# Patient Record
Sex: MALE | Race: WHITE | Employment: FULL TIME | URBAN - METROPOLITAN AREA
[De-identification: names, ages, dates, MRNs, and addresses within clinical notes are randomized per-mention and may not be internally consistent; named-entity substitution may affect disease eponyms.]

---

## 2019-03-21 ENCOUNTER — HOSPITAL ENCOUNTER (OUTPATIENT)
Age: 64
Discharge: HOME | End: 2019-03-23
Payer: COMMERCIAL

## 2019-03-21 ENCOUNTER — OFFICE VISIT (OUTPATIENT)
Dept: PRIMARY CARE CLINIC | Age: 64
End: 2019-03-21
Payer: COMMERCIAL

## 2019-03-21 VITALS
SYSTOLIC BLOOD PRESSURE: 140 MMHG | HEIGHT: 68 IN | OXYGEN SATURATION: 98 % | WEIGHT: 186 LBS | HEART RATE: 76 BPM | TEMPERATURE: 98 F | DIASTOLIC BLOOD PRESSURE: 92 MMHG | BODY MASS INDEX: 28.19 KG/M2

## 2019-03-21 DIAGNOSIS — Z12.5 SCREENING PSA (PROSTATE SPECIFIC ANTIGEN): ICD-10-CM

## 2019-03-21 DIAGNOSIS — N52.9 IMPOTENCE: ICD-10-CM

## 2019-03-21 DIAGNOSIS — Z00.00 ANNUAL PHYSICAL EXAM: Primary | ICD-10-CM

## 2019-03-21 DIAGNOSIS — Z00.00 ANNUAL PHYSICAL EXAM: ICD-10-CM

## 2019-03-21 LAB
ALBUMIN SERPL-MCNC: 4.4 G/DL (ref 3.5–5.2)
ALP BLD-CCNC: 83 U/L (ref 40–129)
ALT SERPL-CCNC: 30 U/L (ref 0–40)
ANION GAP SERPL CALCULATED.3IONS-SCNC: 16 MMOL/L (ref 7–16)
AST SERPL-CCNC: 31 U/L (ref 0–39)
BILIRUB SERPL-MCNC: 0.7 MG/DL (ref 0–1.2)
BUN BLDV-MCNC: 13 MG/DL (ref 8–23)
CALCIUM SERPL-MCNC: 9.4 MG/DL (ref 8.6–10.2)
CHLORIDE BLD-SCNC: 103 MMOL/L (ref 98–107)
CHOLESTEROL, TOTAL: 168 MG/DL (ref 0–199)
CO2: 24 MMOL/L (ref 22–29)
CREAT SERPL-MCNC: 0.9 MG/DL (ref 0.7–1.2)
GFR AFRICAN AMERICAN: >60
GFR NON-AFRICAN AMERICAN: >60 ML/MIN/1.73
GLUCOSE BLD-MCNC: 100 MG/DL (ref 74–99)
HCT VFR BLD CALC: 46.5 % (ref 37–54)
HDLC SERPL-MCNC: 32 MG/DL
HEMOGLOBIN: 16.1 G/DL (ref 12.5–16.5)
LDL CHOLESTEROL CALCULATED: 87 MG/DL (ref 0–99)
MCH RBC QN AUTO: 32.9 PG (ref 26–35)
MCHC RBC AUTO-ENTMCNC: 34.6 % (ref 32–34.5)
MCV RBC AUTO: 95.1 FL (ref 80–99.9)
PDW BLD-RTO: 12.2 FL (ref 11.5–15)
PLATELET # BLD: 162 E9/L (ref 130–450)
PMV BLD AUTO: 12.2 FL (ref 7–12)
POTASSIUM SERPL-SCNC: 4.1 MMOL/L (ref 3.5–5)
PROSTATE SPECIFIC ANTIGEN: 2.67 NG/ML (ref 0–4)
RBC # BLD: 4.89 E12/L (ref 3.8–5.8)
SODIUM BLD-SCNC: 143 MMOL/L (ref 132–146)
TOTAL PROTEIN: 8.1 G/DL (ref 6.4–8.3)
TRIGL SERPL-MCNC: 245 MG/DL (ref 0–149)
VLDLC SERPL CALC-MCNC: 49 MG/DL
WBC # BLD: 5.8 E9/L (ref 4.5–11.5)

## 2019-03-21 PROCEDURE — 80053 COMPREHEN METABOLIC PANEL: CPT

## 2019-03-21 PROCEDURE — 99396 PREV VISIT EST AGE 40-64: CPT | Performed by: FAMILY MEDICINE

## 2019-03-21 PROCEDURE — 81002 URINALYSIS NONAUTO W/O SCOPE: CPT | Performed by: FAMILY MEDICINE

## 2019-03-21 PROCEDURE — G8484 FLU IMMUNIZE NO ADMIN: HCPCS | Performed by: FAMILY MEDICINE

## 2019-03-21 PROCEDURE — 85027 COMPLETE CBC AUTOMATED: CPT

## 2019-03-21 PROCEDURE — 80061 LIPID PANEL: CPT

## 2019-03-21 PROCEDURE — G0103 PSA SCREENING: HCPCS

## 2019-03-21 RX ORDER — SILDENAFIL 100 MG/1
100 TABLET, FILM COATED ORAL PRN
Qty: 10 TABLET | Refills: 5 | Status: ON HOLD | OUTPATIENT
Start: 2019-03-21 | End: 2019-08-19 | Stop reason: HOSPADM

## 2019-03-21 RX ORDER — CLOTRIMAZOLE AND BETAMETHASONE DIPROPIONATE 10; .64 MG/G; MG/G
CREAM TOPICAL
Qty: 45 G | Refills: 1 | Status: ON HOLD | OUTPATIENT
Start: 2019-03-21 | End: 2019-08-19 | Stop reason: HOSPADM

## 2019-03-21 RX ORDER — CLOPIDOGREL BISULFATE 75 MG/1
75 TABLET ORAL DAILY
Qty: 90 TABLET | Refills: 1 | Status: SHIPPED | OUTPATIENT
Start: 2019-03-21 | End: 2019-08-27 | Stop reason: SDUPTHER

## 2019-03-21 RX ORDER — OMEGA-3-ACID ETHYL ESTERS 1 G/1
2 CAPSULE, LIQUID FILLED ORAL 2 TIMES DAILY
Qty: 360 CAPSULE | Refills: 1 | Status: SHIPPED | OUTPATIENT
Start: 2019-03-21

## 2019-03-21 RX ORDER — METOPROLOL SUCCINATE 50 MG/1
50 TABLET, EXTENDED RELEASE ORAL DAILY
Qty: 90 TABLET | Refills: 1 | Status: SHIPPED | OUTPATIENT
Start: 2019-03-21 | End: 2019-08-27 | Stop reason: SDUPTHER

## 2019-03-21 RX ORDER — LISINOPRIL AND HYDROCHLOROTHIAZIDE 20; 12.5 MG/1; MG/1
1 TABLET ORAL DAILY
Qty: 90 TABLET | Refills: 1 | Status: ON HOLD | OUTPATIENT
Start: 2019-03-21 | End: 2019-08-19 | Stop reason: HOSPADM

## 2019-03-21 RX ORDER — LISINOPRIL 20 MG/1
20 TABLET ORAL DAILY
Qty: 90 TABLET | Refills: 1 | Status: CANCELLED | OUTPATIENT
Start: 2019-03-21

## 2019-03-21 ASSESSMENT — PATIENT HEALTH QUESTIONNAIRE - PHQ9
SUM OF ALL RESPONSES TO PHQ QUESTIONS 1-9: 0
SUM OF ALL RESPONSES TO PHQ9 QUESTIONS 1 & 2: 0
2. FEELING DOWN, DEPRESSED OR HOPELESS: 0
1. LITTLE INTEREST OR PLEASURE IN DOING THINGS: 0
SUM OF ALL RESPONSES TO PHQ QUESTIONS 1-9: 0

## 2019-03-25 ENCOUNTER — TELEPHONE (OUTPATIENT)
Dept: PRIMARY CARE CLINIC | Age: 64
End: 2019-03-25

## 2019-07-31 ENCOUNTER — APPOINTMENT (OUTPATIENT)
Dept: GENERAL RADIOLOGY | Age: 64
End: 2019-07-31
Payer: COMMERCIAL

## 2019-07-31 ENCOUNTER — APPOINTMENT (OUTPATIENT)
Dept: CT IMAGING | Age: 64
End: 2019-07-31
Payer: COMMERCIAL

## 2019-07-31 ENCOUNTER — APPOINTMENT (OUTPATIENT)
Dept: ULTRASOUND IMAGING | Age: 64
DRG: 065 | End: 2019-07-31
Attending: INTERNAL MEDICINE
Payer: COMMERCIAL

## 2019-07-31 ENCOUNTER — HOSPITAL ENCOUNTER (EMERGENCY)
Age: 64
Discharge: ANOTHER HOSPITAL | End: 2019-07-31
Attending: EMERGENCY MEDICINE
Payer: COMMERCIAL

## 2019-07-31 ENCOUNTER — HOSPITAL ENCOUNTER (OUTPATIENT)
Age: 64
Discharge: HOME | End: 2019-07-31
Payer: COMMERCIAL

## 2019-07-31 ENCOUNTER — HOSPITAL ENCOUNTER (INPATIENT)
Age: 64
LOS: 5 days | Discharge: NURSING, REHAB OR LONG-TERM CARE | DRG: 065 | End: 2019-08-05
Attending: INTERNAL MEDICINE | Admitting: INTERNAL MEDICINE
Payer: COMMERCIAL

## 2019-07-31 VITALS
RESPIRATION RATE: 17 BRPM | OXYGEN SATURATION: 96 % | DIASTOLIC BLOOD PRESSURE: 94 MMHG | SYSTOLIC BLOOD PRESSURE: 194 MMHG | HEIGHT: 68 IN | BODY MASS INDEX: 27.28 KG/M2 | TEMPERATURE: 97.8 F | HEART RATE: 53 BPM | WEIGHT: 180 LBS

## 2019-07-31 DIAGNOSIS — I63.9 CEREBROVASCULAR ACCIDENT (CVA), UNSPECIFIED MECHANISM (HCC): Primary | ICD-10-CM

## 2019-07-31 PROBLEM — I25.10 CAD (CORONARY ARTERY DISEASE): Chronic | Status: ACTIVE | Noted: 2019-07-31

## 2019-07-31 PROBLEM — Z95.5 S/P CORONARY ARTERY STENT PLACEMENT: Chronic | Status: ACTIVE | Noted: 2019-07-31

## 2019-07-31 LAB
ANION GAP SERPL CALCULATED.3IONS-SCNC: 12 MMOL/L (ref 7–16)
APTT: 28.1 SEC (ref 24.5–35.1)
BUN BLDV-MCNC: 10 MG/DL (ref 8–23)
CALCIUM SERPL-MCNC: 9.1 MG/DL (ref 8.6–10.2)
CHLORIDE BLD-SCNC: 102 MMOL/L (ref 98–107)
CO2: 24 MMOL/L (ref 22–29)
CREAT SERPL-MCNC: 0.8 MG/DL (ref 0.7–1.2)
EKG ATRIAL RATE: 63 BPM
EKG P AXIS: 11 DEGREES
EKG P-R INTERVAL: 176 MS
EKG Q-T INTERVAL: 410 MS
EKG QRS DURATION: 96 MS
EKG QTC CALCULATION (BAZETT): 419 MS
EKG R AXIS: -2 DEGREES
EKG T AXIS: -9 DEGREES
EKG VENTRICULAR RATE: 63 BPM
GFR AFRICAN AMERICAN: >60
GFR NON-AFRICAN AMERICAN: >60 ML/MIN/1.73
GLUCOSE BLD-MCNC: 117 MG/DL (ref 74–99)
HCT VFR BLD CALC: 45.9 % (ref 37–54)
HEMOGLOBIN: 16 G/DL (ref 12.5–16.5)
INR BLD: 1.1
LV EF: 60 %
LVEF MODALITY: NORMAL
MCH RBC QN AUTO: 32.5 PG (ref 26–35)
MCHC RBC AUTO-ENTMCNC: 34.9 % (ref 32–34.5)
MCV RBC AUTO: 93.1 FL (ref 80–99.9)
PDW BLD-RTO: 11.6 FL (ref 11.5–15)
PLATELET # BLD: 159 E9/L (ref 130–450)
PMV BLD AUTO: 11.4 FL (ref 7–12)
POTASSIUM SERPL-SCNC: 5.4 MMOL/L (ref 3.5–5)
PROTHROMBIN TIME: 12.5 SEC (ref 9.3–12.4)
RBC # BLD: 4.93 E12/L (ref 3.8–5.8)
SODIUM BLD-SCNC: 138 MMOL/L (ref 132–146)
TROPONIN: <0.01 NG/ML (ref 0–0.03)
WBC # BLD: 6.4 E9/L (ref 4.5–11.5)

## 2019-07-31 PROCEDURE — 97162 PT EVAL MOD COMPLEX 30 MIN: CPT

## 2019-07-31 PROCEDURE — 99285 EMERGENCY DEPT VISIT HI MDM: CPT

## 2019-07-31 PROCEDURE — 6360000002 HC RX W HCPCS: Performed by: INTERNAL MEDICINE

## 2019-07-31 PROCEDURE — 85730 THROMBOPLASTIN TIME PARTIAL: CPT

## 2019-07-31 PROCEDURE — 85610 PROTHROMBIN TIME: CPT

## 2019-07-31 PROCEDURE — 97530 THERAPEUTIC ACTIVITIES: CPT

## 2019-07-31 PROCEDURE — 93880 EXTRACRANIAL BILAT STUDY: CPT

## 2019-07-31 PROCEDURE — 2580000003 HC RX 258: Performed by: INTERNAL MEDICINE

## 2019-07-31 PROCEDURE — 93005 ELECTROCARDIOGRAM TRACING: CPT | Performed by: EMERGENCY MEDICINE

## 2019-07-31 PROCEDURE — 93010 ELECTROCARDIOGRAM REPORT: CPT | Performed by: INTERNAL MEDICINE

## 2019-07-31 PROCEDURE — 36415 COLL VENOUS BLD VENIPUNCTURE: CPT

## 2019-07-31 PROCEDURE — 80048 BASIC METABOLIC PNL TOTAL CA: CPT

## 2019-07-31 PROCEDURE — 70450 CT HEAD/BRAIN W/O DYE: CPT

## 2019-07-31 PROCEDURE — 71045 X-RAY EXAM CHEST 1 VIEW: CPT

## 2019-07-31 PROCEDURE — 93306 TTE W/DOPPLER COMPLETE: CPT

## 2019-07-31 PROCEDURE — 85027 COMPLETE CBC AUTOMATED: CPT

## 2019-07-31 PROCEDURE — 6370000000 HC RX 637 (ALT 250 FOR IP): Performed by: INTERNAL MEDICINE

## 2019-07-31 PROCEDURE — 2060000000 HC ICU INTERMEDIATE R&B

## 2019-07-31 PROCEDURE — 84484 ASSAY OF TROPONIN QUANT: CPT

## 2019-07-31 PROCEDURE — A0426 ALS 1: HCPCS

## 2019-07-31 PROCEDURE — A0425 GROUND MILEAGE: HCPCS

## 2019-07-31 RX ORDER — SODIUM CHLORIDE 0.9 % (FLUSH) 0.9 %
10 SYRINGE (ML) INJECTION PRN
Status: DISCONTINUED | OUTPATIENT
Start: 2019-07-31 | End: 2019-08-05 | Stop reason: HOSPADM

## 2019-07-31 RX ORDER — ONDANSETRON 2 MG/ML
4 INJECTION INTRAMUSCULAR; INTRAVENOUS EVERY 6 HOURS PRN
Status: DISCONTINUED | OUTPATIENT
Start: 2019-07-31 | End: 2019-08-05 | Stop reason: HOSPADM

## 2019-07-31 RX ORDER — SODIUM CHLORIDE 0.9 % (FLUSH) 0.9 %
10 SYRINGE (ML) INJECTION EVERY 12 HOURS SCHEDULED
Status: DISCONTINUED | OUTPATIENT
Start: 2019-07-31 | End: 2019-08-05 | Stop reason: HOSPADM

## 2019-07-31 RX ORDER — ACETAMINOPHEN 325 MG/1
650 TABLET ORAL EVERY 4 HOURS PRN
Status: DISCONTINUED | OUTPATIENT
Start: 2019-07-31 | End: 2019-08-05 | Stop reason: HOSPADM

## 2019-07-31 RX ORDER — ATORVASTATIN CALCIUM 40 MG/1
40 TABLET, FILM COATED ORAL NIGHTLY
Status: DISCONTINUED | OUTPATIENT
Start: 2019-07-31 | End: 2019-08-05 | Stop reason: HOSPADM

## 2019-07-31 RX ORDER — LISINOPRIL 20 MG/1
20 TABLET ORAL DAILY
Status: DISCONTINUED | OUTPATIENT
Start: 2019-07-31 | End: 2019-08-02

## 2019-07-31 RX ORDER — ASPIRIN 81 MG/1
81 TABLET ORAL DAILY
Status: DISCONTINUED | OUTPATIENT
Start: 2019-07-31 | End: 2019-08-05 | Stop reason: HOSPADM

## 2019-07-31 RX ORDER — CLOPIDOGREL BISULFATE 75 MG/1
75 TABLET ORAL DAILY
Status: DISCONTINUED | OUTPATIENT
Start: 2019-07-31 | End: 2019-08-05 | Stop reason: HOSPADM

## 2019-07-31 RX ORDER — LISINOPRIL 20 MG/1
20 TABLET ORAL ONCE
Status: COMPLETED | OUTPATIENT
Start: 2019-07-31 | End: 2019-07-31

## 2019-07-31 RX ORDER — METOPROLOL SUCCINATE 50 MG/1
50 TABLET, EXTENDED RELEASE ORAL DAILY
Status: DISCONTINUED | OUTPATIENT
Start: 2019-07-31 | End: 2019-08-05 | Stop reason: HOSPADM

## 2019-07-31 RX ADMIN — ATORVASTATIN CALCIUM 40 MG: 40 TABLET, FILM COATED ORAL at 20:05

## 2019-07-31 RX ADMIN — LISINOPRIL 20 MG: 20 TABLET ORAL at 20:03

## 2019-07-31 RX ADMIN — ENOXAPARIN SODIUM 40 MG: 40 INJECTION SUBCUTANEOUS at 20:03

## 2019-07-31 RX ADMIN — Medication 10 ML: at 20:05

## 2019-07-31 RX ADMIN — CLOPIDOGREL 75 MG: 75 TABLET, FILM COATED ORAL at 20:04

## 2019-07-31 ASSESSMENT — PAIN SCALES - GENERAL
PAINLEVEL_OUTOF10: 0
PAINLEVEL_OUTOF10: 3

## 2019-07-31 ASSESSMENT — PAIN DESCRIPTION - PAIN TYPE: TYPE: ACUTE PAIN

## 2019-07-31 ASSESSMENT — PAIN DESCRIPTION - LOCATION: LOCATION: SHOULDER

## 2019-07-31 ASSESSMENT — ENCOUNTER SYMPTOMS
NAUSEA: 0
TROUBLE SWALLOWING: 0
SHORTNESS OF BREATH: 0
VISUAL CHANGE: 0
BACK PAIN: 0
VOMITING: 0

## 2019-07-31 ASSESSMENT — PAIN DESCRIPTION - DESCRIPTORS: DESCRIPTORS: CONSTANT;DULL

## 2019-07-31 ASSESSMENT — PAIN DESCRIPTION - ORIENTATION: ORIENTATION: LEFT

## 2019-07-31 ASSESSMENT — PAIN DESCRIPTION - FREQUENCY: FREQUENCY: CONTINUOUS

## 2019-07-31 NOTE — PROGRESS NOTES
Physical Therapy  Initial Assessment     Name: Young Urrutia  : 1955  MRN: [id number]    Date of Service: 2019    Evaluating PT: Tee Patel PT, DPT [id number]    Room #:  4089/7490-N    Diagnosis: Acute CVA  Precautions: Fall risk    Pt lives alone in a 2 story house with 3 stair(s) and 1 rail(s) to enter. Bed is on the second floor and bath is on the second floor. Full flight of stairs and 1 rail to second floor. Pt ambulated without AD Independent prior to admission. Pt works full time as the president of a company. HPI: Pt presented to the ED on  for R sided weakness, incoordination, and speech impairment. Initial Evaluation  Date: 19 Treatment Date: Short Term/ Long Term   Goals   AM-PAC 6 Clicks 46/92     Was pt agreeable to Eval/treatment? Yes     Does pt have pain? No current complaints of pain     Bed Mobility  Rolling: SBA  Supine to sit: SBA  Sit to supine: NT  Scooting: SBA toward EOB  Rolling: Independent   Supine to sit: Independent   Sit to supine: Independent   Scooting: Independent    Transfers Sit to stand: SBA  Stand to sit: SBA  Stand pivot: Min A without AD  Sit to stand: Independent   Stand to sit: Independent   Stand pivot: Independent    Ambulation   75 feet x2 without AD with Min A  400 feet Independent    Stair negotiation: 4 steps with 2 rail(s) with Min A  10 step(s) with 1 rail(s) Mod Independent    ROM B UE: Refer to OT note  B LE: WFL     Strength B UE: Refer to OT note  B LE: L 5/5, R 4+/5     Balance Sitting EOB: SBA  Dynamic standing: Min A without AD  Sitting EOB: Independent   Dynamic standing: Independent      Pt is A & O x: 4 to person, place, month/year, and situation. Sensation: Pt denies numbness and tingling to extremities. B LE light touch sensation intact. Coordination: NT  Edema: Unremarkable. ASSESSMENT    Patient education  Pt educated on proper technique/safety when negotiating stairs.     Patient response to education:   Pt

## 2019-08-01 ENCOUNTER — APPOINTMENT (OUTPATIENT)
Dept: MRI IMAGING | Age: 64
DRG: 065 | End: 2019-08-01
Attending: INTERNAL MEDICINE
Payer: COMMERCIAL

## 2019-08-01 LAB
ANION GAP SERPL CALCULATED.3IONS-SCNC: 14 MMOL/L (ref 7–16)
BUN BLDV-MCNC: 11 MG/DL (ref 8–23)
CALCIUM SERPL-MCNC: 9.8 MG/DL (ref 8.6–10.2)
CHLORIDE BLD-SCNC: 101 MMOL/L (ref 98–107)
CHOLESTEROL, TOTAL: 182 MG/DL (ref 0–199)
CO2: 26 MMOL/L (ref 22–29)
CREAT SERPL-MCNC: 0.9 MG/DL (ref 0.7–1.2)
GFR AFRICAN AMERICAN: >60
GFR NON-AFRICAN AMERICAN: >60 ML/MIN/1.73
GLUCOSE BLD-MCNC: 116 MG/DL (ref 74–99)
HDLC SERPL-MCNC: 30 MG/DL
LDL CHOLESTEROL CALCULATED: 102 MG/DL (ref 0–99)
POTASSIUM SERPL-SCNC: 3.8 MMOL/L (ref 3.5–5)
SODIUM BLD-SCNC: 141 MMOL/L (ref 132–146)
TRIGL SERPL-MCNC: 248 MG/DL (ref 0–149)
VLDLC SERPL CALC-MCNC: 50 MG/DL

## 2019-08-01 PROCEDURE — 70551 MRI BRAIN STEM W/O DYE: CPT

## 2019-08-01 PROCEDURE — 97110 THERAPEUTIC EXERCISES: CPT

## 2019-08-01 PROCEDURE — 97530 THERAPEUTIC ACTIVITIES: CPT

## 2019-08-01 PROCEDURE — 2580000003 HC RX 258: Performed by: INTERNAL MEDICINE

## 2019-08-01 PROCEDURE — 80061 LIPID PANEL: CPT

## 2019-08-01 PROCEDURE — 97535 SELF CARE MNGMENT TRAINING: CPT

## 2019-08-01 PROCEDURE — 6370000000 HC RX 637 (ALT 250 FOR IP): Performed by: INTERNAL MEDICINE

## 2019-08-01 PROCEDURE — 97166 OT EVAL MOD COMPLEX 45 MIN: CPT

## 2019-08-01 PROCEDURE — 36415 COLL VENOUS BLD VENIPUNCTURE: CPT

## 2019-08-01 PROCEDURE — 92526 ORAL FUNCTION THERAPY: CPT | Performed by: SPEECH-LANGUAGE PATHOLOGIST

## 2019-08-01 PROCEDURE — 80048 BASIC METABOLIC PNL TOTAL CA: CPT

## 2019-08-01 PROCEDURE — 6360000002 HC RX W HCPCS: Performed by: INTERNAL MEDICINE

## 2019-08-01 PROCEDURE — 92610 EVALUATE SWALLOWING FUNCTION: CPT | Performed by: SPEECH-LANGUAGE PATHOLOGIST

## 2019-08-01 PROCEDURE — 92523 SPEECH SOUND LANG COMPREHEN: CPT | Performed by: SPEECH-LANGUAGE PATHOLOGIST

## 2019-08-01 PROCEDURE — 2060000000 HC ICU INTERMEDIATE R&B

## 2019-08-01 RX ADMIN — ACETAMINOPHEN 650 MG: 325 TABLET, FILM COATED ORAL at 17:10

## 2019-08-01 RX ADMIN — ASPIRIN 81 MG: 81 TABLET, COATED ORAL at 08:38

## 2019-08-01 RX ADMIN — ENOXAPARIN SODIUM 40 MG: 40 INJECTION SUBCUTANEOUS at 08:39

## 2019-08-01 RX ADMIN — CLOPIDOGREL 75 MG: 75 TABLET, FILM COATED ORAL at 08:38

## 2019-08-01 RX ADMIN — ATORVASTATIN CALCIUM 40 MG: 40 TABLET, FILM COATED ORAL at 21:03

## 2019-08-01 RX ADMIN — Medication 10 ML: at 21:03

## 2019-08-01 RX ADMIN — Medication 10 ML: at 08:39

## 2019-08-01 RX ADMIN — METOPROLOL SUCCINATE 50 MG: 50 TABLET, EXTENDED RELEASE ORAL at 08:38

## 2019-08-01 RX ADMIN — LISINOPRIL 20 MG: 20 TABLET ORAL at 08:38

## 2019-08-01 ASSESSMENT — ENCOUNTER SYMPTOMS
ABDOMINAL DISTENTION: 0
VOMITING: 0
SORE THROAT: 0
CHOKING: 0
DIARRHEA: 0
SHORTNESS OF BREATH: 0
NAUSEA: 0
SINUS PRESSURE: 0
RHINORRHEA: 0
EYE PAIN: 0
ANAL BLEEDING: 0
WHEEZING: 0
CHEST TIGHTNESS: 0
EYE ITCHING: 0
STRIDOR: 0
CONSTIPATION: 0
ABDOMINAL PAIN: 0
EYE DISCHARGE: 0
APNEA: 0
SINUS PAIN: 0
EYE REDNESS: 0
COUGH: 0

## 2019-08-01 ASSESSMENT — PAIN SCALES - GENERAL
PAINLEVEL_OUTOF10: 0
PAINLEVEL_OUTOF10: 5

## 2019-08-01 ASSESSMENT — VISUAL ACUITY: VA_NORMAL: 1

## 2019-08-01 NOTE — PROGRESS NOTES
Speech Language Pathology      NAME:  Tank Jansen  :  1955  DATE: 2019  ROOM:  8505/8505-    Pt seen for ongoing dysphagia management s/p CBSE. Pt educated on the recommendation of mechanical soft (moist and minced) and thin liquids with the use of liquid wash/ alternate solids and liquids, as well as lingual sweep. Pt agreeable at this time, although noted to be slightly hesitant. Will follow.     Acute CVA (cerebrovascular accident) [facility]) [I63.9]    Lucila Gill M.S., CCC-SLP  Speech-Language Pathologist  [id number]  2019

## 2019-08-01 NOTE — PROGRESS NOTES
Adapter  [] Midline  [] PICC      [] Central Line    [] TPN       [] Oxygen: [] Trach [] Bi-PAP [] CPAP  [] Nasal cannula  [] Liters:     [] Wound Care:   [] Pressure ulcers(stage and location)    [] Wound vac   [] Wound or incision care    [] Pain Management (level of pain, meds):      [] Incontinence Bladder [] Hilario  Insertion date:   []Hemodialysis and  Frequency:   [] Incontinence Bowel    [x] Last bowel movement : 8/5/19    [x] Substance use history:  [x] Tobacco  [x] Alcohol  [] Other     [] Ethnic  [] Cultural  [] Spiritual  [] Language [] Needs  [] Other than English  [] Hearing Impaired  [] Visually Impaired  [] Speaking Impaired  [] Blind    [] Special equipment:  [] Devices/Splints  [] Type   [] Brace   [] Type  [] Bariatric bed  [] Extra wide commode  [] Extra wide wheelchair [] Extra wide walker  [] Cole walker  [] Cole wheelchair  [] Transfer lift    [] Other equipment     FUNCTIONAL STATUS PT / Maged Singletary / Jacinto Taylor:  Saul Prakash / DIANE Discipline Initial: 8/1/19 Follow Up: 8/5/19 Current:    Eating OT Stand by Assist   Set up      Grooming OT Minimum assistance   Minimum assistance      Bathing OT Moderate Assist   Moderate Assist      Dressing Upper Extremity OT Minimum assistance   Moderate Assist      Dressing Lower Extremity OT Moderate Assist   Moderate Assist      Toileting OT Moderate Assist   Moderate Assist      Toilet Transfers OT Moderate Assist   Moderate Assist      Tub/Shower Transfers OT nt nt    Homemaking OT nt nt    Bed Mobility PT Stand by Assist   Moderate Assist      Bed/Wheelchair Transfers PT Minimum assistance   Moderate Assist      Locomotion Walk / Wheelchair  Device:  Distance: PT 75 feet x2 without AD with Min A  (7/31) 15 feet x2 with SBQC with Mod A x2    Endurance PT      Expression SP Mild cognitive-linguistic deficits with moderate dysarthria     Social Interaction SP      Problem Solving SP      Memory SP      Comprehension SP      Swallowing SP Mild-Mod

## 2019-08-01 NOTE — PROGRESS NOTES
Pt was found on bathroom floor around 0120 . Pt states he hit his head on door. Vital signs were stable. Educated pt to call before getting out of bed and bed alarm is on. Notified family and doctor about fall.

## 2019-08-01 NOTE — PROGRESS NOTES
Recommended observation. level of function, completion of standardized testing/informal observation of tasks, assessment of data, and development of POC/Goals. Assessment of current deficits  Functional mobility [x]  ADLs [x] Strength [x]  Cognition []  Functional transfers  [x] IADLs [] Safety Awareness [x]  Endurance [x]  Fine Motor Coordination [x] Balance [x] Vision/perception [] Sensation []   Gross Motor Coordination [] ROM [] Delirium []                  Motor Control []    Plan of Care:  ADL retraining [x]   Equipment needs [x]   Neuromuscular re-education [x] Energy Conservation Techniques [x]  Functional Transfer training [x] Patient and/or Family Education [x]  Functional Mobility training [x]  Environmental Modifications [x]  Cognitive re-training []   Compensatory techniques for ADLs [x]  Splinting Needs []   Positioning to improve overall function [x]   Therapeutic Activity [x]  Therapeutic Exercise  [x]  Visual/Perceptual: []    Delirium prevention/treatment  []   Other:  []    Rehab Potential: Good for established goals    Patient / Family Goal: Not stated     Patient and/or family were instructed on diagnosis, prognosis/goals and plan of care. Demonstrated fair+ understanding. [] Malnutrition indicators have been identified and nursing has been notified to ensure a dietitian consult is ordered.        Mod Evaluation completed +  Timed Treatment: 13 minutes  Tx Time in: 08:10  TxTime out: 08:23    Tx Time in: 09:42  Tx Time out: 10:06      [facility], OTR/L #[id number]

## 2019-08-01 NOTE — CARE COORDINATION
Met with patient at bedside to explain role & transition of care. Patient transferred from [facility] and admitted for right upper & lower extremity weakness, disturbances in coordination and loss of balance. Patient lives alone in a two story home with four steps to enter and bed & full bath on the second level. His wife Norbert Reynolds currently lives in [facility] in their second home. His wife is on her way back to [place] and should arrive tomorrow 8/2. Pt was independent , employed full time & drove prior to admission. He has no family in the area. He is in [place] for his job the past 20 years. No history of DME, HHC or JUANITA. PT AM KASSIDY 15, await OT eval & Speech eval.  Reviewed PCP is Dr. Lyn Age is [facility] in [facility]. A friend will transport home upon discharge if his wife is not here. Await echo and OT eval.  Await Neuro & Speech input. Will continue to follow.   Samantha Morley RN CM

## 2019-08-01 NOTE — PROGRESS NOTES
SPEECH/LANGUAGE PATHOLOGY  BEDSIDE SWALLOWING EVALUATION    PATIENT NAME:  Jennifer Chamorro      :  1955      TODAY'S DATE:  2019  ROOM:  [facility]5/8505B    SUMMARY OF EVALUATION     DYSPHAGIA DIAGNOSIS:  Mild-Mod oropharyngeal dysphagia      DIET RECOMMENDATIONS:  Dysphagia 2,  Mechanical Soft (Minced & Moist) solids with  thin liquids     FEEDING RECOMMENDATIONS:     Assistance level:  Set-up is required for all oral intake      Compensatory strategies recommended: Alternate solids and liquids and Check for oral pocketing    THERAPY RECOMMENDATIONS:      Dysphagia therapy is recommended 3-5 times per week with emphasis on the following, To Improve oral motor strength and range of motion and To monitor oral intake during meals and make recommendations for diet changes as appropriate    Patient report:  Pt reports limited appetite, denies difficulty swallowing but did report difficulty opening oral cavity to eat. Diet prior to admit:  Reg/ Thin                  PROCEDURE     Consistencies Administered During the Evaluation   Liquids: thin liquid   Solids:  pureed foods and soft solid foods      Method of Intake:   straw, spoon  Self fed  And fed by clinician    Position:   Seated, upright    Current Respiratory Status   room air                RESULTS     Oral Stage:         Decreased mastication due to:  disorganized chewing pattern and Oral residuals were noted :  right buccal cavity         Pharyngeal Stage:      No signs of aspiration were noted during this evaluation however, silent aspiration cannot be ruled out at bedside. If silent aspiration is suspected, a Videofluoroscopic Study of Swallowing (MBS) is recommended and requires a physician order.                   Prognosis for improvements is good, This plan will be re-evaluated and revised in 1 week  if warranted. , Patient stated goals: Agreed with above, Treatment goals discussed with Patient, The Patient understand the diagnosis, prognosis and plan of care. [x]The admitting diagnosis and active problem list, as listed below have been reviewed prior to initiation of this evaluation.      ADMITTING DIAGNOSIS: Acute CVA (cerebrovascular accident) [facility]) [I63.9]     ACTIVE PROBLEM LIST:   Patient Active Problem List   Diagnosis    Ethmoidal sinusitis    Viral pharyngitis    Hepatitis C    Diverticula of colon    Ischemic heart disease    Hyperlipidemia    Lumbar spine strain    Hypertension    Arteriosclerotic heart disease (ASHD)    Acute CVA (cerebrovascular accident) ([address]    CAD (coronary artery disease)    S/P coronary artery stent placement       Leobardo Mart M.S., CCC-SLP  Speech-Language Pathologist  [id number]  8/1/2019

## 2019-08-01 NOTE — PROGRESS NOTES
Sustained attention   Orientation:  Oriented to Person, Place, Date, Reason for hospitalization    Memory   Biographical:  recalled Address, Birthdate, Age and Family  Delayed recall:  recalled 1/3 words    Organization/Problem Solving/Reasoning   Verbal Sequencing:  Impaired/ incomplete     Problem solving (verbal): mildly iimpaired    Mathematics: (Simple arithmetic) :Functional with self correcttion    CLINICAL OBSERVATIONS NOTED DURING THE EVALUATION   Reduced attention, dsyarthric speech, decreased memory ability                  Prognosis for improvements is good, This plan will be re-evaluated and revised in 1 week  if warranted. , Patient stated goals: Agreed with above, Treatment goals discussed with Patient, The Patient understand the diagnosis, prognosis and plan of care. The admitting diagnosis and active problem list, as listed below have been reviewed prior to initiation of this evaluation.      ADMITTING DIAGNOSIS: Acute CVA (cerebrovascular accident) [facility]) [I63.9]     ACTIVE PROBLEM LIST:   Patient Active Problem List   Diagnosis    Ethmoidal sinusitis    Viral pharyngitis    Hepatitis C    Diverticula of colon    Ischemic heart disease    Hyperlipidemia    Lumbar spine strain    Hypertension    Arteriosclerotic heart disease (ASHD)    Acute CVA (cerebrovascular accident) ([address]    CAD (coronary artery disease)    S/P coronary artery stent placement         Leobardo Mart M.S., CCC-SLP  Speech-Language Pathologist  [id number]  8/1/2019

## 2019-08-01 NOTE — CONSULTS
Jaqueline Blum is a 61 y.o. male with Pmhx of HTN; HLD; treated Hep C; CAD s/p CABG x 2. He presented to [facility] on 7/31/19 account of slurred speech and R sided weakness. Last well known was the night before, as patient woke up with symptoms. He denies any antecedent history of trauma. He denies any headache, nausea, vomiting. Patient claims he smokes about a pack of cigarette per day, drinks about 2 -3 cans of beer/week, and denied any other substance use. He presented to the ED on account of the above symptoms. At the ED, NIHSS was 1; and he was out of tPA window. Ct head showed No acute intracranial hemorrhage or mass effect; Patchy and confluent areas of hypoattenuation within the cerebral white matter, which are nonspecific but most compatible with moderate changes of microangiopathy. He was transferred to [facility] for neurologic evaluation. Prior to Visit Medications    Medication Sig Taking? Authorizing Provider   clopidogrel (PLAVIX) 75 MG tablet Take 1 tablet by mouth daily Yes Alberto John DO   clotrimazole-betamethasone (LOTRISONE) 1-0.05 % cream Apply topically 2 times daily.  Yes Alberto John DO   metoprolol succinate (TOPROL XL) 50 MG extended release tablet Take 1 tablet by mouth daily Yes Alberto John DO   sildenafil (VIAGRA) 100 MG tablet Take 1 tablet by mouth as needed for Erectile Dysfunction Yes Alberto John DO   omega-3 acid ethyl esters (LOVAZA) 1 g capsule Take 2 capsules by mouth 2 times daily Yes Alberto John DO   lisinopril-hydrochlorothiazide (PRINZIDE;ZESTORETIC) 20-12.5 MG per tablet Take 1 tablet by mouth daily Yes Alberto John DO   lisinopril (PRINIVIL;ZESTRIL) 20 MG tablet TAKE 1 TABLET BY MOUTH DAILY Yes Zoie No, DO   Coenzyme Q-10 100 MG CAPS Take by mouth Yes Historical Provider, MD     Social History     Tobacco Use    Smoking status: Former Smoker     Packs/day: 1.00     Years: 35.00     Pack years: Allergic/Immunologic: Negative for environmental allergies. Neurological: Positive for facial asymmetry, speech difficulty, weakness and numbness. Negative for dizziness, tremors, seizures, syncope and headaches. Hematological: Does not bruise/bleed easily. Psychiatric/Behavioral: Negative for suicidal ideas. Objective:   BP (!) 190/89   Pulse 76   Temp 98.5 °F (36.9 °C)   Resp 18   Ht 5' 8\" (1.727 m)   Wt 180 lb (81.6 kg)   SpO2 95%   BMI 27.37 kg/m²     Physical Exam   Constitutional: He is oriented to person, place, and time. He appears well-developed and well-nourished. No distress. HENT:   Head: Normocephalic and atraumatic. Eyes: Pupils are equal, round, and reactive to light. Conjunctivae and EOM are normal.   Neck: Normal range of motion. Neck supple. No JVD present. No tracheal deviation present. No thyromegaly present. Cardiovascular: Normal rate, regular rhythm, S1 normal, S2 normal, normal heart sounds and intact distal pulses. Exam reveals no gallop and no friction rub. No murmur heard. Pulmonary/Chest: Effort normal and breath sounds normal. No stridor. No respiratory distress. He has no wheezes. He has no rales. He exhibits no tenderness. Abdominal: Soft. Bowel sounds are normal. He exhibits no distension and no mass. There is no tenderness. There is no rebound and no guarding. Musculoskeletal: Normal range of motion. He exhibits no edema or tenderness. Lymphadenopathy:     He has no cervical adenopathy. Neurological: He is alert and oriented to person, place, and time. He has normal reflexes. Skin: Skin is warm. He is not diaphoretic. Psychiatric: He has a normal mood and affect. His behavior is normal. Judgment and thought content normal.     Neurological Exam  Mental Status  Alert. Oriented to person, place, time and situation. Recalls 3 of 3 objects immediately. At 5 minutes recalls 2 of 3 objects. Recalls 3 of 3 objects with prompting.  Moderate dysarthria

## 2019-08-01 NOTE — PROGRESS NOTES
Hospital Medicine    Subjective:  Pt alert conversive pt states r arm feels heavy speech thick      Current Facility-Administered Medications:     clopidogrel (PLAVIX) tablet 75 mg, 75 mg, Oral, Daily, Uriah Burnham DO, 75 mg at 07/31/19 2004    lisinopril (PRINIVIL;ZESTRIL) tablet 20 mg, 20 mg, Oral, Daily, Uriah Burnham DO    metoprolol succinate (TOPROL XL) extended release tablet 50 mg, 50 mg, Oral, Daily, Uriah Burnham DO    sodium chloride flush 0.9 % injection 10 mL, 10 mL, Intravenous, 2 times per day, Anni Egan DO, 10 mL at 07/31/19 2005    sodium chloride flush 0.9 % injection 10 mL, 10 mL, Intravenous, PRN, Uriah Burnham DO    magnesium hydroxide (MILK OF MAGNESIA) 400 MG/5ML suspension 30 mL, 30 mL, Oral, Daily PRN, Uriah Burnham DO    ondansetron [facility]) injection 4 mg, 4 mg, Intravenous, Q6H PRN, Uriah Burnham DO    enoxaparin (LOVENOX) injection 40 mg, 40 mg, Subcutaneous, Daily, Uriah Burnham DO, 40 mg at 07/31/19 2003    acetaminophen (TYLENOL) tablet 650 mg, 650 mg, Oral, Q4H PRN, Uriah Burnham DO    aspirin EC tablet 81 mg, 81 mg, Oral, Daily, Uriah Burnham DO    atorvastatin (LIPITOR) tablet 40 mg, 40 mg, Oral, Nightly, Uriah Burnham DO, 40 mg at 07/31/19 2005    perflutren lipid microspheres (DEFINITY) injection 1.65 mg, 1.5 mL, Intravenous, ONCE PRN, Uriah Burnham DO    Objective:    BP (!) 156/90   Pulse 85   Temp 98.6 °F (37 °C) (Temporal)   Resp 22   Ht 5' 8\" (1.727 m)   Wt 180 lb (81.6 kg)   SpO2 96%   BMI 27.37 kg/m²     Heart:  Reg  Lungs:  CTA bilaterally, no wheeze, rales or rhonchi  Abd: bowel sounds present, nontender, nondistended, no masses  Extrem:  No clubbing, cyanosis, or edema    CBC with Differential:    Lab Results   Component Value Date    WBC 6.4 07/31/2019    RBC 4.93 07/31/2019    HGB 16.0 07/31/2019    HCT 45.9 07/31/2019     07/31/2019    MCV 93.1 07/31/2019    MCH 32.5 07/31/2019    MCHC 34.9 07/31/2019    RDW 11.6 07/31/2019     CMP:    Lab Results   Component Value Date     07/31/2019    K 5.4 07/31/2019     07/31/2019    CO2 24 07/31/2019    BUN 10 07/31/2019    CREATININE 0.8 07/31/2019    GFRAA >60 07/31/2019    LABGLOM >60 07/31/2019    GLUCOSE 117 07/31/2019    PROT 8.1 03/21/2019    LABALBU 4.4 03/21/2019    CALCIUM 9.1 07/31/2019    BILITOT 0.7 03/21/2019    ALKPHOS 83 03/21/2019    AST 31 03/21/2019    ALT 30 03/21/2019     Warfarin PT/INR:    Lab Results   Component Value Date    INR 1.1 07/31/2019    PROTIME 12.5 (H) 07/31/2019       Assessment:    Active Problems:    Hyperlipidemia    Hypertension    Acute CVA (cerebrovascular accident) ([address]    CAD (coronary artery disease)    S/P coronary artery stent placement  Resolved Problems:    * No resolved hospital problems.  *      Plan:  ot speech eval neuro to see        Zeus Remy  7:40 AM  8/1/2019

## 2019-08-02 ENCOUNTER — APPOINTMENT (OUTPATIENT)
Dept: CT IMAGING | Age: 64
DRG: 065 | End: 2019-08-02
Attending: INTERNAL MEDICINE
Payer: COMMERCIAL

## 2019-08-02 ENCOUNTER — APPOINTMENT (OUTPATIENT)
Dept: MRI IMAGING | Age: 64
DRG: 065 | End: 2019-08-02
Attending: INTERNAL MEDICINE
Payer: COMMERCIAL

## 2019-08-02 PROBLEM — R13.10 DYSPHAGIA: Status: ACTIVE | Noted: 2019-08-02

## 2019-08-02 LAB — HBA1C MFR BLD: 5.5 % (ref 4–5.6)

## 2019-08-02 PROCEDURE — 83036 HEMOGLOBIN GLYCOSYLATED A1C: CPT

## 2019-08-02 PROCEDURE — 6360000002 HC RX W HCPCS: Performed by: INTERNAL MEDICINE

## 2019-08-02 PROCEDURE — 6370000000 HC RX 637 (ALT 250 FOR IP): Performed by: INTERNAL MEDICINE

## 2019-08-02 PROCEDURE — 2060000000 HC ICU INTERMEDIATE R&B

## 2019-08-02 PROCEDURE — 92526 ORAL FUNCTION THERAPY: CPT

## 2019-08-02 PROCEDURE — 97127 HC SP THER IVNTJ W/FOCUS COG FUNCJ: CPT

## 2019-08-02 PROCEDURE — 99232 SBSQ HOSP IP/OBS MODERATE 35: CPT | Performed by: NURSE PRACTITIONER

## 2019-08-02 PROCEDURE — 70551 MRI BRAIN STEM W/O DYE: CPT

## 2019-08-02 PROCEDURE — 36415 COLL VENOUS BLD VENIPUNCTURE: CPT

## 2019-08-02 PROCEDURE — 2580000003 HC RX 258: Performed by: INTERNAL MEDICINE

## 2019-08-02 PROCEDURE — 70450 CT HEAD/BRAIN W/O DYE: CPT

## 2019-08-02 RX ORDER — LISINOPRIL 20 MG/1
20 TABLET ORAL 2 TIMES DAILY
Status: DISCONTINUED | OUTPATIENT
Start: 2019-08-02 | End: 2019-08-05 | Stop reason: HOSPADM

## 2019-08-02 RX ADMIN — Medication 10 ML: at 09:25

## 2019-08-02 RX ADMIN — ATORVASTATIN CALCIUM 40 MG: 40 TABLET, FILM COATED ORAL at 20:25

## 2019-08-02 RX ADMIN — LISINOPRIL 20 MG: 20 TABLET ORAL at 20:25

## 2019-08-02 RX ADMIN — ENOXAPARIN SODIUM 40 MG: 40 INJECTION SUBCUTANEOUS at 09:24

## 2019-08-02 RX ADMIN — METOPROLOL SUCCINATE 50 MG: 50 TABLET, EXTENDED RELEASE ORAL at 09:24

## 2019-08-02 RX ADMIN — Medication 10 ML: at 20:25

## 2019-08-02 RX ADMIN — ASPIRIN 81 MG: 81 TABLET, COATED ORAL at 09:24

## 2019-08-02 RX ADMIN — LISINOPRIL 20 MG: 20 TABLET ORAL at 09:24

## 2019-08-02 RX ADMIN — CLOPIDOGREL 75 MG: 75 TABLET, FILM COATED ORAL at 09:24

## 2019-08-02 ASSESSMENT — PAIN SCALES - GENERAL
PAINLEVEL_OUTOF10: 0
PAINLEVEL_OUTOF10: 0

## 2019-08-02 NOTE — PROGRESS NOTES
Met with pt to discuss ARU. He is agreeable. Discussed with Dr. Garret Huang and will accept to ARU pending completion of testing, medical stability and precert. Will initiate precert today.

## 2019-08-02 NOTE — PROGRESS NOTES
Approval received from payor source for ARU. Pt had neuro changes and Dr. Gurinder Hoskins would like to keep pt over the weekend and obtain another brain MRI. Will need to re-initiate precert Monday. , Candelaria Pickard, placed pt on weekend schedule for therapy. Will re-evaluate pt Monday. Dr. Mc Castillo aware.

## 2019-08-02 NOTE — CARE COORDINATION
Per [facility] at acute, precert is obtained for patient to go to acute. Neurology wants to keep today and order MRI. Have placed patient on weekend therapy list.  Will re initiate precert Monday for discharge.

## 2019-08-02 NOTE — DISCHARGE INSTR - COC
Continuity of Care Form    Patient Name: Carolee Be   :  1955  MRN:  [id number]    Admit date:  2019  Discharge date:  19    Code Status Order: Full Code   Advance Directives:   [facility] Documentation     Date/Time Healthcare Directive Type of Healthcare Directive Copy in [address] Agent's Name Healthcare Agent's Phone Number    [phone]  No, patient does not have an advance directive for healthcare treatment -- -- -- -- --          Admitting Physician:  Mohit Warren DO  PCP: David Castillo DO    Discharging Nurse: [facility] Unit/Room#: 8505/8505-B  Discharging Unit Phone Number: [phone]    Emergency Contact:   Extended Emergency Contact Information  Primary Emergency Contact: Denisha Carroll  Address: [address] [place] [address] Phone: [phone]  Relation: Spouse    Past Surgical History:  Past Surgical History:   Procedure Laterality Date    CORONARY ANGIOPLASTY WITH STENT PLACEMENT  2009    1 in Feb & 2 in Aug       Immunization History:   Immunization History   Administered Date(s) Administered    Influenza, Triv, 3 Years and older, IM (Afluria (5 yrs and older) 2016       Active Problems:  Patient Active Problem List   Diagnosis Code    Ethmoidal sinusitis J32.2    Viral pharyngitis J02.9    Hepatitis C B19.20    Diverticula of colon K57.30    Ischemic heart disease I25.9    Hyperlipidemia E78.5    Lumbar spine strain S39.012A    Hypertension I10    Arteriosclerotic heart disease (ASHD) I25.10    Acute CVA (cerebrovascular accident) ([facility] [address] I63.9    CAD (coronary artery disease) I25.10    S/P coronary artery stent placement Z95.5    Dysphagia R13.10       Isolation/Infection:   Isolation          No Isolation            Nurse Assessment:  Last Vital Signs: BP (!) 160/98   Pulse 84   Temp 98.3 °F (36.8 °C) (Oral)   Resp 18   Ht 5' 8\" (1.727 m)   Wt 180 lb (81.6 kg)   SpO2 98%   BMI 27.37 kg/m²     Last documented pain score (0-10 scale): Pain Level: 0  Last Weight:   Wt Readings from Last 1 Encounters:   07/31/19 180 lb (81.6 kg)     Mental Status:  oriented and alert    IV Access:  - None    Nursing Mobility/ADLs:  Walking   Dependent  Transfer  Dependent  Bathing  Assisted  Dressing  Assisted  Toileting  Assisted  Feeding  Independent  Med Admin  Independent  Med Delivery   none    Wound Care Documentation and Therapy:        Elimination:  Continence:   · Bowel: Yes  · Bladder: Yes  Urinary Catheter: None   Colostomy/Ileostomy/Ileal Conduit: No       Date of Last BM: 8/3/19    Intake/Output Summary (Last 24 hours) at 8/2/2019 0805  Last data filed at 8/2/2019 0609  Gross per 24 hour   Intake 600 ml   Output 200 ml   Net 400 ml     I/O last 3 completed shifts: In: 600 [P.O.:600]  Out: 200 [Urine:200]    Safety Concerns:     None    Impairments/Disabilities:      None    Nutrition Therapy:  Current Nutrition Therapy:   - Oral Diet:  Dysphagia 1 pureed    Routes of Feeding: Oral  Liquids: Thin Liquids  Daily Fluid Restriction: no  Last Modified Barium Swallow with Video (Video Swallowing Test): Done on 8/3/19    Treatments at the Time of Hospital Discharge:   Respiratory Treatments:   Oxygen Therapy:  is not on home oxygen therapy.   Ventilator:    - No ventilator support    Rehab Therapies: Physical Therapy, Occupational Therapy and Speech/Language Therapy  Weight Bearing Status/Restrictions: No weight bearing restirctions  Other Medical Equipment (for information only, NOT a DME order):  bedside commode  Other Treatments:     Patient's personal belongings (please select all that are sent with patient):  Sandro    RN SIGNATURE:  Electronically signed by Kristina Osuna RN on 8/5/19 at 10:50 AM    CASE MANAGEMENT/SOCIAL WORK SECTION    Inpatient Status Date: ***    Readmission Risk Assessment Score:  Readmission Risk Risk of Unplanned Readmission:        8           Discharging to Facility/ Agency   · Name:   · Address:  · Phone:  · Fax:    Dialysis Facility (if applicable)   · Name:  · Address:  · Dialysis Schedule:  · Phone:  · Fax:    / signature: {Esignature:[id number]}    PHYSICIAN SECTION    Prognosis: {Prognosis:1775670004}    Condition at Discharge: Hang Condon Patient Condition:339018324}    Rehab Potential (if transferring to Rehab): {Prognosis:2745086317}    Recommended Labs or Other Treatments After Discharge: ***    Physician Certification: I certify the above information and transfer of Jazlyn Joseph  is necessary for the continuing treatment of the diagnosis listed and that he requires {Admit to Appropriate Level of Care:98587} for {GREATER/LESS:018263390} 30 days.      Update Admission H&P: {CHP DME Changes in ZDJTW:312802420} Electronically signed by Kendra Storey DO on 8/2/2019 at 8:05 AM      PHYSICIAN SIGNATURE:  {Esignature:111501623} Electronically signed by Kendra Storey DO on 8/5/2019 at 8:39 AM

## 2019-08-02 NOTE — PROGRESS NOTES
07/31/2019    MCH 32.5 07/31/2019    MCHC 34.9 07/31/2019    RDW 11.6 07/31/2019     CMP:    Lab Results   Component Value Date     08/01/2019    K 3.8 08/01/2019     08/01/2019    CO2 26 08/01/2019    BUN 11 08/01/2019    CREATININE 0.9 08/01/2019    GFRAA >60 08/01/2019    LABGLOM >60 08/01/2019    GLUCOSE 116 08/01/2019    PROT 8.1 03/21/2019    LABALBU 4.4 03/21/2019    CALCIUM 9.8 08/01/2019    BILITOT 0.7 03/21/2019    ALKPHOS 83 03/21/2019    AST 31 03/21/2019    ALT 30 03/21/2019     Warfarin PT/INR:    Lab Results   Component Value Date    INR 1.1 07/31/2019    PROTIME 12.5 (H) 07/31/2019       Assessment:    Active Problems:    Hyperlipidemia    Hypertension    Acute CVA (cerebrovascular accident) ([facility] [address]    CAD (coronary artery disease)    S/P coronary artery stent placement    Dysphagia  Resolved Problems:    * No resolved hospital problems. *      Plan:   To rehab increase lisinopril        Clif Vogel  8:02 AM  8/2/2019

## 2019-08-03 ENCOUNTER — APPOINTMENT (OUTPATIENT)
Dept: GENERAL RADIOLOGY | Age: 64
DRG: 065 | End: 2019-08-03
Attending: INTERNAL MEDICINE
Payer: COMMERCIAL

## 2019-08-03 PROCEDURE — 74230 X-RAY XM SWLNG FUNCJ C+: CPT

## 2019-08-03 PROCEDURE — 2580000003 HC RX 258: Performed by: INTERNAL MEDICINE

## 2019-08-03 PROCEDURE — 99232 SBSQ HOSP IP/OBS MODERATE 35: CPT | Performed by: NURSE PRACTITIONER

## 2019-08-03 PROCEDURE — 2500000003 HC RX 250 WO HCPCS: Performed by: INTERNAL MEDICINE

## 2019-08-03 PROCEDURE — 6360000002 HC RX W HCPCS: Performed by: INTERNAL MEDICINE

## 2019-08-03 PROCEDURE — 2060000000 HC ICU INTERMEDIATE R&B

## 2019-08-03 PROCEDURE — 92611 MOTION FLUOROSCOPY/SWALLOW: CPT | Performed by: SPEECH-LANGUAGE PATHOLOGIST

## 2019-08-03 PROCEDURE — 6370000000 HC RX 637 (ALT 250 FOR IP): Performed by: INTERNAL MEDICINE

## 2019-08-03 RX ADMIN — ASPIRIN 81 MG: 81 TABLET, COATED ORAL at 08:17

## 2019-08-03 RX ADMIN — BARIUM SULFATE 58 G: 960 POWDER, FOR SUSPENSION ORAL at 11:18

## 2019-08-03 RX ADMIN — BARIUM SULFATE 45 G: 0.6 CREAM ORAL at 11:18

## 2019-08-03 RX ADMIN — METOPROLOL SUCCINATE 50 MG: 50 TABLET, EXTENDED RELEASE ORAL at 08:17

## 2019-08-03 RX ADMIN — CLOPIDOGREL 75 MG: 75 TABLET, FILM COATED ORAL at 08:17

## 2019-08-03 RX ADMIN — Medication 10 ML: at 08:17

## 2019-08-03 RX ADMIN — ENOXAPARIN SODIUM 40 MG: 40 INJECTION SUBCUTANEOUS at 08:17

## 2019-08-03 RX ADMIN — LISINOPRIL 20 MG: 20 TABLET ORAL at 08:17

## 2019-08-03 RX ADMIN — Medication 10 ML: at 20:38

## 2019-08-03 RX ADMIN — LISINOPRIL 20 MG: 20 TABLET ORAL at 20:37

## 2019-08-03 RX ADMIN — ATORVASTATIN CALCIUM 40 MG: 40 TABLET, FILM COATED ORAL at 20:37

## 2019-08-03 ASSESSMENT — PAIN SCALES - GENERAL
PAINLEVEL_OUTOF10: 0

## 2019-08-03 NOTE — PROGRESS NOTES
Sabrina Sacks is a 61 y.o. right handed male     Neurology following for stroke    Past hx of HTN, HLD, tx Hep C, CAD s/p CABG x 2    Presented with slurred speech and right sided weakness  ---not tPA candidate out of window >4 hours   ---NIHSS was 1    Right arm was flaccid yesterday  ---stat CTH was ordered which showed stable stroke  ---MRI brain done and just a slight increase in patients stroke to left internal capsule     Son at bedside     Patient said he was having trouble eating his breakfast had a few bites and stopped eating cause he could not swallow and having problems drinking water  ---ordered video swallow     No chest pain or palpitations  No coughing or wheezing    No vertigo, lightheadedness or loss of consciousness  No falls, tripping or stumbling  No incontinence of bowels or bladder  No itching or bruising appreciated  No numbness or tingling   + focal right arm/leg weakness    ROS otherwise negative      Objective:     BP (!) 142/89   Pulse 79   Temp 98 °F (36.7 °C) (Temporal)   Resp 18   Ht 5' 8\" (1.727 m)   Wt 180 lb (81.6 kg)   SpO2 90%   BMI 27.37 kg/m²     General appearance: alert, appears stated age, cooperative and no distress  Head: normocephalic, without obvious abnormality, atraumatic  Eyes: conjunctivae/corneas clear  Neck: no adenopathy, no carotid bruit, supple, symmetrical  Lungs: clear to auscultation bilaterally  Heart: regular rate and rhythm, S1, S2 normal, no murmur, click, rub or gallop  Abdomen: soft, non-tender; bowel sounds normal  Extremities:  normal, atraumatic, no cyanosis or edema  Pulses: 2+ and symmetric  Skin: color, texture, turgor normal---no rashes or lesions      Mental Status: Alert and oriented x 3. Follows commands.     Appropriate attention/concentration  Intact fundus of knowledge    Speech: mild dysarthria  Language:no aphasias     Cranial Nerves:  I: smell NA   II: visual acuity  NA   II: visual fields Full to confrontation   II: pupils

## 2019-08-03 NOTE — PROGRESS NOTES
SPEECH/LANGUAGE PATHOLOGY  VIDEOFLUOROSCOPIC STUDY OF SWALLOWING (MBS)      PATIENT NAME:  Young Urrutia      :  1955      TODAY'S DATE:  8/3/2019    SUMMARY OF EVALUATION     DYSPHAGIA DIAGNOSIS:  Moderate oral dysphagia      DIET RECOMMENDATIONS: puree diet, thin liquids as tolerated-fatigue noted with mastication and pt c/o decreased energy. Pt requesting puree diet at this time.      FEEDING RECOMMENDATIONS:     Assistance level:  Stand by assistance is needed during all oral intake      Compensatory strategies recommended: Small bites/sips, Alternate solids and liquids and Check for oral pocketing    THERAPY RECOMMENDATIONS:      Dysphagia therapy is recommended 3-5 times per week with emphasis on the following, To Improve oral motor strength and range of motion and To monitor oral intake during meals and make recommendations for diet changes as appropriate                  PROCEDURE     Consistencies Administered During the Evaluation   Liquids: thin liquid and nectar thick liquid   Solids:  pureed foods and solid foods      Method of Intake:   cup, straw, spoon  Self fed, Fed by clinician      Position:   Seated, upright, Lateral plane    Current Respiratory Status   room air                RESULTS     ORAL STAGE       Decreased mastication due to:  decreased lingual control, Delayed A-P transit due to: reduced lingual strength  and Oral residuals were noted :  right buccal cavity and throughout the oral cavity        PHARYNGEAL STAGE           ONSET TIME     Onset time of the pharyngeal swallow was adequate       PHARYNGEAL RESIDUALS          Vallecula/Pharyngeal Wall           No significant residuals were noted in the vallecula      Pyriform Sinuses      No significant residuals were noted in the pyriform sinuses    LARYNGEAL PENETRATION     Laryngeal penetration was not present during this evaluation                 ASPIRATION    Aspiration was not present during this evaluation         COMPENSATORY STRATEGIES       Compensatory strategies that were beneficial included Small bites/sips, Alternate solids and liquids and Check for oral pocketing      STRUCTURAL/FUNCTIONAL ANOMALIES       No structural/functional anomalies were noted      CERVICAL ESOPHAGEAL STAGE :        The cervical esophagus appeared adequate                              The Speech Language Pathologist (SLP) completed education with the patient regarding results of evaluation. Prognosis for improvements is good, This plan will be re-evaluated and revised in 1 week  if warranted. , Patient stated goals: Agreed with above, Treatment goals discussed with Patient, The Patient understand the diagnosis, prognosis and plan of care. [x]The admitting diagnosis and active problem list, as listed below have been reviewed prior to initiation of this evaluation.      ADMITTING DIAGNOSIS: Acute CVA (cerebrovascular accident) [facility]) [I63.9]     ACTIVE PROBLEM LIST:   Patient Active Problem List   Diagnosis    Ethmoidal sinusitis    Viral pharyngitis    Hepatitis C    Diverticula of colon    Ischemic heart disease    Hyperlipidemia    Lumbar spine strain    Hypertension    Arteriosclerotic heart disease (ASHD)    Acute [facility]A [facility]ce[facility]bro[facility] accident) ([facility] [address]    CAD (coronary artery disease)    S/P coronary artery stent placement    Dysphagia

## 2019-08-04 PROCEDURE — 6360000002 HC RX W HCPCS: Performed by: INTERNAL MEDICINE

## 2019-08-04 PROCEDURE — 2060000000 HC ICU INTERMEDIATE R&B

## 2019-08-04 PROCEDURE — 6370000000 HC RX 637 (ALT 250 FOR IP): Performed by: INTERNAL MEDICINE

## 2019-08-04 PROCEDURE — 97535 SELF CARE MNGMENT TRAINING: CPT

## 2019-08-04 PROCEDURE — 2580000003 HC RX 258: Performed by: INTERNAL MEDICINE

## 2019-08-04 PROCEDURE — 97530 THERAPEUTIC ACTIVITIES: CPT

## 2019-08-04 PROCEDURE — 97110 THERAPEUTIC EXERCISES: CPT

## 2019-08-04 RX ADMIN — CLOPIDOGREL 75 MG: 75 TABLET, FILM COATED ORAL at 08:25

## 2019-08-04 RX ADMIN — METOPROLOL SUCCINATE 50 MG: 50 TABLET, EXTENDED RELEASE ORAL at 08:25

## 2019-08-04 RX ADMIN — LISINOPRIL 20 MG: 20 TABLET ORAL at 20:32

## 2019-08-04 RX ADMIN — Medication 10 ML: at 20:33

## 2019-08-04 RX ADMIN — LISINOPRIL 20 MG: 20 TABLET ORAL at 08:25

## 2019-08-04 RX ADMIN — ASPIRIN 81 MG: 81 TABLET, COATED ORAL at 08:25

## 2019-08-04 RX ADMIN — ATORVASTATIN CALCIUM 40 MG: 40 TABLET, FILM COATED ORAL at 20:32

## 2019-08-04 RX ADMIN — Medication 10 ML: at 08:26

## 2019-08-04 RX ADMIN — ENOXAPARIN SODIUM 40 MG: 40 INJECTION SUBCUTANEOUS at 08:25

## 2019-08-04 ASSESSMENT — PAIN SCALES - GENERAL
PAINLEVEL_OUTOF10: 0
PAINLEVEL_OUTOF10: 0

## 2019-08-04 NOTE — PROGRESS NOTES
Physical Therapy  Daily Treatment Note    Name: Artis Britton  : 1955  MRN: [id number]    Date of Service: 2019    Evaluating PT: Denver Jackie PT, DPT [id number]    Room #:  2845/3124-G    Diagnosis: Acute CVA  Precautions: Fall risk, R hemiparesis, dysarthria, alarm    Pt lives alone in a 2 story house with 3 stair(s) and 1 rail(s) to enter. Bed is on the second floor and bath is on the second floor. Full flight of stairs and 1 rail to second floor. Pt ambulated without AD Independent prior to admission. Pt works full time as the president of a company. HPI: Pt presented to the ED on  for R sided weakness, incoordination, and speech impairment. Initial Evaluation  Date: 19 Treatment Date: 19 Short Term/ Long Term   Goals   AM-PAC 6 Clicks  87/46    Was pt agreeable to Eval/treatment? Yes Yes    Does pt have pain? No current complaints of pain No current complaints of pain    Bed Mobility  Rolling: SBA  Supine to sit: SBA  Sit to supine: NT  Scooting: SBA toward EOB Rolling: NT  Supine to sit: Min A  Sit to supine: Min A  Scooting: SBA toward EOB Rolling: Independent   Supine to sit: Independent   Sit to supine: Independent   Scooting: Independent    Transfers Sit to stand: SBA  Stand to sit: SBA  Stand pivot: Min A without AD Sit to stand: Min A  Stand to sit: Min A  Stand pivot: Max A x2 with [id number] assist and SBQC Min A with [employer]   Ambulation   75 feet x2 without AD with Min A 12 feet with [id number] assist with Max A x2  12 feet with SBQC with Max A x2 100 feet with [employer] with Min A   Stair negotiation: 4 steps with 2 rail(s) with Min A NT 4 steps with 1 rail with Mod A   ROM B UE: Refer to OT note  B LE: WFL NT    Strength B UE: Refer to OT note  B LE: L 5/5, R 4+/5 NT    Balance Sitting EOB: SBA  Dynamic standing: Min A without AD Sitting EOB: Min A  Dynamic standing:  Max A x2 with [id number] assist and SBQC Sitting EOB: Independent   Dynamic standing: Independent

## 2019-08-04 NOTE — PROGRESS NOTES
prevent falls and allow pt to return home safely. Comments: Upon arrival pt was in bed & agreeable for thearpy. Pt demonstrates only trace movement of R shoulder elevation, otherwise flaccid R UE. At end of session pt was returned to bed due to fatigue & for safety all lines and tubes intact, call light within reach & bed alarm set. · Pt has made Fair progress towards set goals. · Continue with current plan of care    Time in: 11:00am  Time out: 11:40am  Total Tx Time: 40 minutes    Chandu Pate.  [address] [place] [address], [address] [place] [address]

## 2019-08-04 NOTE — PLAN OF CARE
Problem: Falls - Risk of:  Goal: Will remain free from falls  Description  Will remain free from falls  Outcome: Met This Shift  Goal: Absence of physical injury  Description  Absence of physical injury  Outcome: Met This Shift     Problem: HEMODYNAMIC STATUS  Goal: Patient has stable vital signs and fluid balance  Outcome: Met This Shift     Problem: ACTIVITY INTOLERANCE/IMPAIRED MOBILITY  Goal: Mobility/activity is maintained at optimum level for patient  Outcome: Met This Shift

## 2019-08-04 NOTE — PROGRESS NOTES
Subjective:    Chief complaint:    No problems overnight. Denies chest pain, angina, and dyspnea. Denies abdominal pain. Tolerating diet. No nausea or vomiting. Right sided weakness unchanged    Objective:    BP (!) 143/77   Pulse 75   Temp 98.9 °F (37.2 °C) (Temporal)   Resp 16   Ht 5' 8\" (1.727 m)   Wt 180 lb (81.6 kg)   SpO2 96%   BMI 27.37 kg/m²   General : Awake ,alert,no distress. Heart:  RRR, no murmurs, gallops, or rubs. Lungs:  CTA bilaterally, no wheeze, rales or rhonchi  Abd: bowel sounds present, nontender, nondistended, no masses  Extrem:  No clubbing, cyanosis, or edema  Right sided weakness noted arm and leg      CBC:   Lab Results   Component Value Date    WBC 6.4 07/31/2019    RBC 4.93 07/31/2019    HGB 16.0 07/31/2019    HCT 45.9 07/31/2019    MCV 93.1 07/31/2019    MCH 32.5 07/31/2019    MCHC 34.9 07/31/2019    RDW 11.6 07/31/2019     07/31/2019    MPV 11.4 07/31/2019     BMP:    Lab Results   Component Value Date     08/01/2019    K 3.8 08/01/2019     08/01/2019    CO2 26 08/01/2019    BUN 11 08/01/2019    LABALBU 4.4 03/21/2019    CREATININE 0.9 08/01/2019    CALCIUM 9.8 08/01/2019    GFRAA >60 08/01/2019    LABGLOM >60 08/01/2019    GLUCOSE 116 08/01/2019     PT/INR:    Lab Results   Component Value Date    PROTIME 12.5 07/31/2019    INR 1.1 07/31/2019     Troponin:    Lab Results   Component Value Date    TROPONINI <0.01 07/31/2019       No results for input(s): LABURIN in the last 72 hours. No results for input(s): BC in the last 72 hours. No results for input(s): Ailyn Nova in the last 72 hours.       Current Facility-Administered Medications:     lisinopril (PRINIVIL;ZESTRIL) tablet 20 mg, 20 mg, Oral, BID, Lawson Crank Malmer, DO, 20 mg at 08/04/19 0825    clopidogrel (PLAVIX) tablet 75 mg, 75 mg, Oral, Daily, Renny Burnham DO, 75 mg at 08/04/19 0825    metoprolol succinate (TOPROL XL) extended release tablet 50 mg, 50 mg, Oral, Daily, Renny Garcia loss/atrophy with white matter changes   suggestive of sequelae small vessel ischemic disease. 3. Vascular calcifications within the bilateral internal carotid   artery cavernous segments. Lubna Loose MRI Brain WO Contrast   Final Result      1. Findings suggest recent stroke involving left internal capsule. Clinical correlation recommended. 2. Severe burden of chronic microvascular ischemic change involving   periventricular and subcortical white matter. 3. Pituitary gland not visualized suggestive of empty sella syndrome. US CAROTID ARTERY BILATERAL   Final Result   Focal calcified plaque formation causing mild to moderate anatomic   steno, no higher than 50% is seen in the right bulbar region. No hemodynamic stenosis in the right or left carotid arterial systems. Assessment:    Active Problems:    Hyperlipidemia    Hypertension    Acute CVA (cerebrovascular accident) ([facility] [address]    CAD (coronary artery disease)    S/P coronary artery stent placement    Dysphagia  Resolved Problems:    * No resolved hospital problems. *      Plan:    Plan is for acute rehab on discharge    Peng Russo  11:35 AM  8/4/2019    NOTE: This report was transcribed using voice recognition software.  Every effort was made to ensure accuracy; however, inadvertent transcription errors may be present

## 2019-08-05 ENCOUNTER — HOSPITAL ENCOUNTER (INPATIENT)
Age: 64
LOS: 17 days | Discharge: HOME | DRG: 057 | End: 2019-08-22
Attending: PHYSICAL MEDICINE & REHABILITATION | Admitting: PHYSICAL MEDICINE & REHABILITATION
Payer: COMMERCIAL

## 2019-08-05 VITALS
HEART RATE: 98 BPM | SYSTOLIC BLOOD PRESSURE: 132 MMHG | TEMPERATURE: 98.2 F | HEIGHT: 68 IN | WEIGHT: 180 LBS | RESPIRATION RATE: 16 BRPM | OXYGEN SATURATION: 97 % | DIASTOLIC BLOOD PRESSURE: 61 MMHG | BODY MASS INDEX: 27.28 KG/M2

## 2019-08-05 PROBLEM — I63.9 ISCHEMIC STROKE (HCC): Status: ACTIVE | Noted: 2019-08-05

## 2019-08-05 PROCEDURE — 97110 THERAPEUTIC EXERCISES: CPT

## 2019-08-05 PROCEDURE — 97530 THERAPEUTIC ACTIVITIES: CPT

## 2019-08-05 PROCEDURE — 6360000002 HC RX W HCPCS: Performed by: INTERNAL MEDICINE

## 2019-08-05 PROCEDURE — 6370000000 HC RX 637 (ALT 250 FOR IP): Performed by: INTERNAL MEDICINE

## 2019-08-05 PROCEDURE — 97535 SELF CARE MNGMENT TRAINING: CPT

## 2019-08-05 PROCEDURE — 1280000000 HC REHAB R&B

## 2019-08-05 PROCEDURE — 6370000000 HC RX 637 (ALT 250 FOR IP): Performed by: PHYSICAL MEDICINE & REHABILITATION

## 2019-08-05 PROCEDURE — 2580000003 HC RX 258: Performed by: INTERNAL MEDICINE

## 2019-08-05 RX ORDER — CLOPIDOGREL BISULFATE 75 MG/1
75 TABLET ORAL DAILY
Status: CANCELLED | OUTPATIENT
Start: 2019-08-05

## 2019-08-05 RX ORDER — CLOPIDOGREL BISULFATE 75 MG/1
75 TABLET ORAL DAILY
Status: DISCONTINUED | OUTPATIENT
Start: 2019-08-06 | End: 2019-08-22 | Stop reason: HOSPADM

## 2019-08-05 RX ORDER — METOPROLOL SUCCINATE 50 MG/1
50 TABLET, EXTENDED RELEASE ORAL DAILY
Status: CANCELLED | OUTPATIENT
Start: 2019-08-05

## 2019-08-05 RX ORDER — ATORVASTATIN CALCIUM 40 MG/1
40 TABLET, FILM COATED ORAL NIGHTLY
Status: DISCONTINUED | OUTPATIENT
Start: 2019-08-05 | End: 2019-08-22 | Stop reason: HOSPADM

## 2019-08-05 RX ORDER — ATORVASTATIN CALCIUM 40 MG/1
40 TABLET, FILM COATED ORAL NIGHTLY
Status: CANCELLED | OUTPATIENT
Start: 2019-08-05

## 2019-08-05 RX ORDER — METOPROLOL SUCCINATE 50 MG/1
50 TABLET, EXTENDED RELEASE ORAL DAILY
Status: DISCONTINUED | OUTPATIENT
Start: 2019-08-06 | End: 2019-08-22 | Stop reason: HOSPADM

## 2019-08-05 RX ORDER — ASPIRIN 81 MG/1
81 TABLET ORAL DAILY
Status: CANCELLED | OUTPATIENT
Start: 2019-08-05

## 2019-08-05 RX ORDER — LISINOPRIL 20 MG/1
20 TABLET ORAL 2 TIMES DAILY
Status: CANCELLED | OUTPATIENT
Start: 2019-08-05

## 2019-08-05 RX ORDER — ASPIRIN 81 MG/1
81 TABLET ORAL DAILY
Status: DISCONTINUED | OUTPATIENT
Start: 2019-08-06 | End: 2019-08-22 | Stop reason: HOSPADM

## 2019-08-05 RX ORDER — PANTOPRAZOLE SODIUM 20 MG/1
20 TABLET, DELAYED RELEASE ORAL
Status: DISCONTINUED | OUTPATIENT
Start: 2019-08-05 | End: 2019-08-13

## 2019-08-05 RX ORDER — ACETAMINOPHEN 325 MG/1
650 TABLET ORAL EVERY 4 HOURS PRN
Status: CANCELLED | OUTPATIENT
Start: 2019-08-05

## 2019-08-05 RX ORDER — MAGNESIUM HYDROXIDE/ALUMINUM HYDROXICE/SIMETHICONE 120; 1200; 1200 MG/30ML; MG/30ML; MG/30ML
30 SUSPENSION ORAL 4 TIMES DAILY PRN
Status: DISCONTINUED | OUTPATIENT
Start: 2019-08-05 | End: 2019-08-22 | Stop reason: HOSPADM

## 2019-08-05 RX ORDER — LISINOPRIL 20 MG/1
20 TABLET ORAL 2 TIMES DAILY
Status: DISCONTINUED | OUTPATIENT
Start: 2019-08-05 | End: 2019-08-22 | Stop reason: HOSPADM

## 2019-08-05 RX ORDER — SENNA PLUS 8.6 MG/1
1 TABLET ORAL DAILY PRN
Status: DISCONTINUED | OUTPATIENT
Start: 2019-08-05 | End: 2019-08-22 | Stop reason: HOSPADM

## 2019-08-05 RX ORDER — ACETAMINOPHEN 325 MG/1
650 TABLET ORAL EVERY 4 HOURS PRN
Status: DISCONTINUED | OUTPATIENT
Start: 2019-08-05 | End: 2019-08-22 | Stop reason: HOSPADM

## 2019-08-05 RX ADMIN — CLOPIDOGREL 75 MG: 75 TABLET, FILM COATED ORAL at 08:49

## 2019-08-05 RX ADMIN — LISINOPRIL 20 MG: 20 TABLET ORAL at 20:54

## 2019-08-05 RX ADMIN — ASPIRIN 81 MG: 81 TABLET, COATED ORAL at 08:49

## 2019-08-05 RX ADMIN — ENOXAPARIN SODIUM 40 MG: 40 INJECTION SUBCUTANEOUS at 08:49

## 2019-08-05 RX ADMIN — Medication 10 ML: at 08:49

## 2019-08-05 RX ADMIN — ATORVASTATIN CALCIUM 40 MG: 40 TABLET, FILM COATED ORAL at 20:54

## 2019-08-05 RX ADMIN — LISINOPRIL 20 MG: 20 TABLET ORAL at 08:49

## 2019-08-05 RX ADMIN — PANTOPRAZOLE SODIUM 20 MG: 20 TABLET, DELAYED RELEASE ORAL at 19:05

## 2019-08-05 RX ADMIN — METOPROLOL SUCCINATE 50 MG: 50 TABLET, EXTENDED RELEASE ORAL at 08:49

## 2019-08-05 RX ADMIN — ALUMINUM HYDROXIDE, MAGNESIUM HYDROXIDE, AND SIMETHICONE 30 ML: 200; 200; 20 SUSPENSION ORAL at 19:05

## 2019-08-05 ASSESSMENT — PAIN SCALES - GENERAL
PAINLEVEL_OUTOF10: 0

## 2019-08-05 NOTE — PROGRESS NOTES
07/31/2019    MCV 93.1 07/31/2019    MCH 32.5 07/31/2019    MCHC 34.9 07/31/2019    RDW 11.6 07/31/2019     CMP:    Lab Results   Component Value Date     08/01/2019    K 3.8 08/01/2019     08/01/2019    CO2 26 08/01/2019    BUN 11 08/01/2019    CREATININE 0.9 08/01/2019    GFRAA >60 08/01/2019    LABGLOM >60 08/01/2019    GLUCOSE 116 08/01/2019    PROT 8.1 03/21/2019    LABALBU 4.4 03/21/2019    CALCIUM 9.8 08/01/2019    BILITOT 0.7 03/21/2019    ALKPHOS 83 03/21/2019    AST 31 03/21/2019    ALT 30 03/21/2019     Warfarin PT/INR:    Lab Results   Component Value Date    INR 1.1 07/31/2019    PROTIME 12.5 (H) 07/31/2019       Assessment:    Active Problems:    Hyperlipidemia    Hypertension    Acute CVA (cerebrovascular accident) ([facility] [address]    CAD (coronary artery disease)    S/P coronary artery stent placement    Dysphagia  Resolved Problems:    * No resolved hospital problems.  *      Plan:  Dc to acute rehab        Laquita Richards  9:15 AM  8/5/2019

## 2019-08-05 NOTE — CARE COORDINATION
Precert  for [facility]. They will re submit precert today. Await auth.

## 2019-08-05 NOTE — PROGRESS NOTES
Patient oriented to room and new admission folder given.  Patient Guide reviewed and patient given an explanation of Rights And Responsibilities  Augusto Cast 8/5/2019 2:22 PM

## 2019-08-05 NOTE — LETTER
Water exercise programs, different exercise equipment, indoor pool. [address] at the . [address] [facility]. Fermin [address]  [phone]  Free health screenings, health education, health information on community resources, fitness classes & fitness center. [facility]  [address], [address] [place] [address]  [phone]  8AM-4PM daily with noon meal.  Activities include arts, crafts, knitting and kim [employer]  [address]  [phone]  Low level fitness classes and warm water arthritis classes. [facility] [facility]  [phone]  Group meets at 12PM on Mondays, Wednesdays and Fridays. Activities include bingo, lunch and special programs.

## 2019-08-06 LAB
ALBUMIN SERPL-MCNC: 4 G/DL (ref 3.5–5.2)
ALP BLD-CCNC: 80 U/L (ref 40–129)
ALT SERPL-CCNC: 24 U/L (ref 0–40)
ANION GAP SERPL CALCULATED.3IONS-SCNC: 13 MMOL/L (ref 7–16)
AST SERPL-CCNC: 28 U/L (ref 0–39)
BASOPHILS ABSOLUTE: 0.04 E9/L (ref 0–0.2)
BASOPHILS RELATIVE PERCENT: 0.6 % (ref 0–2)
BILIRUB SERPL-MCNC: 1 MG/DL (ref 0–1.2)
BUN BLDV-MCNC: 41 MG/DL (ref 8–23)
CALCIUM SERPL-MCNC: 9.3 MG/DL (ref 8.6–10.2)
CHLORIDE BLD-SCNC: 105 MMOL/L (ref 98–107)
CO2: 24 MMOL/L (ref 22–29)
CREAT SERPL-MCNC: 1.1 MG/DL (ref 0.7–1.2)
EOSINOPHILS ABSOLUTE: 0.22 E9/L (ref 0.05–0.5)
EOSINOPHILS RELATIVE PERCENT: 3.2 % (ref 0–6)
GFR AFRICAN AMERICAN: >60
GFR NON-AFRICAN AMERICAN: >60 ML/MIN/1.73
GLUCOSE BLD-MCNC: 114 MG/DL (ref 74–99)
HCT VFR BLD CALC: 50 % (ref 37–54)
HEMOGLOBIN: 17.4 G/DL (ref 12.5–16.5)
IMMATURE GRANULOCYTES #: 0.02 E9/L
IMMATURE GRANULOCYTES %: 0.3 % (ref 0–5)
INR BLD: 1.2
LYMPHOCYTES ABSOLUTE: 1.87 E9/L (ref 1.5–4)
LYMPHOCYTES RELATIVE PERCENT: 27 % (ref 20–42)
MCH RBC QN AUTO: 32.9 PG (ref 26–35)
MCHC RBC AUTO-ENTMCNC: 34.8 % (ref 32–34.5)
MCV RBC AUTO: 94.5 FL (ref 80–99.9)
MONOCYTES ABSOLUTE: 1.14 E9/L (ref 0.1–0.95)
MONOCYTES RELATIVE PERCENT: 16.5 % (ref 2–12)
NEUTROPHILS ABSOLUTE: 3.63 E9/L (ref 1.8–7.3)
NEUTROPHILS RELATIVE PERCENT: 52.4 % (ref 43–80)
PDW BLD-RTO: 12 FL (ref 11.5–15)
PLATELET # BLD: 144 E9/L (ref 130–450)
PMV BLD AUTO: 12 FL (ref 7–12)
POTASSIUM REFLEX MAGNESIUM: 3.9 MMOL/L (ref 3.5–5)
PROTHROMBIN TIME: 13.2 SEC (ref 9.3–12.4)
RBC # BLD: 5.29 E12/L (ref 3.8–5.8)
SODIUM BLD-SCNC: 142 MMOL/L (ref 132–146)
TOTAL PROTEIN: 7.8 G/DL (ref 6.4–8.3)
WBC # BLD: 6.9 E9/L (ref 4.5–11.5)

## 2019-08-06 PROCEDURE — 92611 MOTION FLUOROSCOPY/SWALLOW: CPT

## 2019-08-06 PROCEDURE — 85025 COMPLETE CBC W/AUTO DIFF WBC: CPT

## 2019-08-06 PROCEDURE — 92523 SPEECH SOUND LANG COMPREHEN: CPT

## 2019-08-06 PROCEDURE — 97162 PT EVAL MOD COMPLEX 30 MIN: CPT

## 2019-08-06 PROCEDURE — 6370000000 HC RX 637 (ALT 250 FOR IP): Performed by: INTERNAL MEDICINE

## 2019-08-06 PROCEDURE — 97166 OT EVAL MOD COMPLEX 45 MIN: CPT

## 2019-08-06 PROCEDURE — 6370000000 HC RX 637 (ALT 250 FOR IP): Performed by: PHYSICAL MEDICINE & REHABILITATION

## 2019-08-06 PROCEDURE — 97110 THERAPEUTIC EXERCISES: CPT

## 2019-08-06 PROCEDURE — 6360000002 HC RX W HCPCS: Performed by: INTERNAL MEDICINE

## 2019-08-06 PROCEDURE — 1280000000 HC REHAB R&B

## 2019-08-06 PROCEDURE — 97530 THERAPEUTIC ACTIVITIES: CPT

## 2019-08-06 PROCEDURE — 80053 COMPREHEN METABOLIC PANEL: CPT

## 2019-08-06 PROCEDURE — 97535 SELF CARE MNGMENT TRAINING: CPT

## 2019-08-06 PROCEDURE — 36415 COLL VENOUS BLD VENIPUNCTURE: CPT

## 2019-08-06 PROCEDURE — 97112 NEUROMUSCULAR REEDUCATION: CPT

## 2019-08-06 PROCEDURE — 85610 PROTHROMBIN TIME: CPT

## 2019-08-06 RX ADMIN — LISINOPRIL 20 MG: 20 TABLET ORAL at 21:52

## 2019-08-06 RX ADMIN — ATORVASTATIN CALCIUM 40 MG: 40 TABLET, FILM COATED ORAL at 21:52

## 2019-08-06 RX ADMIN — CLOPIDOGREL 75 MG: 75 TABLET, FILM COATED ORAL at 08:28

## 2019-08-06 RX ADMIN — PANTOPRAZOLE SODIUM 20 MG: 20 TABLET, DELAYED RELEASE ORAL at 06:25

## 2019-08-06 RX ADMIN — ENOXAPARIN SODIUM 40 MG: 40 INJECTION SUBCUTANEOUS at 08:28

## 2019-08-06 RX ADMIN — METOPROLOL SUCCINATE 50 MG: 50 TABLET, EXTENDED RELEASE ORAL at 08:28

## 2019-08-06 RX ADMIN — ASPIRIN 81 MG: 81 TABLET, COATED ORAL at 08:28

## 2019-08-06 RX ADMIN — LISINOPRIL 20 MG: 20 TABLET ORAL at 08:28

## 2019-08-06 ASSESSMENT — PAIN SCALES - GENERAL
PAINLEVEL_OUTOF10: 0

## 2019-08-06 NOTE — DISCHARGE SUMMARY
[address]                  [address] [facility],  [facility]                               DISCHARGE SUMMARY    PATIENT NAME: Loly Rodriguez                     :        1955  MED REC NO:   [id number]                            ROOM:       8505  ACCOUNT NO:   [de-identified]                           ADMIT DATE: 2019  PROVIDER:     Jamaica Mae DO                  DISCHARGE DATE:  2019    DISCHARGE DIAGNOSES:  1. Acute CVA. 2.  Hypertension. 3.  Hepatitis C.  4.  Hyperlipidemia. 5.  Coronary artery disease, status post coronary artery stent. HOSPITAL COURSE:  The patient is a 51-year-old  male who  initially presented to [facility] Emergency Room complaining  of acute onset of right upper and lower extremity weakness and slurred  speech. He was seen in the emergency room at [facility] and  transferred to [facility] for further neurologic  evaluation. He was seen by Neurology. CT scan of the head revealed no  acute changes. MRI of the brain on 2019 revealed findings  suggestive of recent stroke involving the left internal capsule, severe  burden of chronic microvascular ischemic change involving the  periventricular and subcortical white matter. Ultrasound of carotids on  2019 revealed no hemodynamic stenosis in the right or left carotid  arterial systems. Echocardiogram on 2019 with bubble study  revealed no shunt, left ventricular ejection fraction of 60%, stage I  diastolic dysfunction. The patient was seen by Physical Med Rehab. The  patient was transferred to acute rehab for further care on 2019 in  stable condition. DISCHARGE MEDICATIONS:  As per discharge med rec.         Shanon Davis DO    D: 2019 13:01:39       T: 2019 13:05:10     [email or web address]  Job#: [id number]     Doc#: [id number]    CC:  Abel Cantu DO

## 2019-08-06 NOTE — PROGRESS NOTES
initiation time 0.349 seconds, [place] size- 0.003, hold deviation 0.072 & displacement 0.043. Plan   Times per week: OT twice a day for 4 weeks to address above problem areas  Times per day: Twice a day  Current Treatment Recommendations: Strengthening, Positioning, ROM, Gait Training, Safety Education & Training, Balance Training, Patient/Caregiver Education & Training, Self-Care / ADL, Functional Mobility Training, Neuromuscular Re-education, Equipment Evaluation, Education, & procurement, Home Management Training, Endurance Training, Modalities (comment)    Assessment   Performance deficits / Impairments: Decreased functional mobility ; Decreased high-level IADLs;Decreased ADL status; Decreased endurance;Decreased fine motor control;Decreased ROM; Decreased sensation;Decreased coordination;Decreased strength;Decreased balance;Decreased safe awareness  Prognosis: Good  Decision Making: Medium Complexity  OT Education: OT Role;Plan of Care;IADL Safety;Precautions; ADL Adaptive Strategies; Equipment;Transfer Training  Patient Education: Pt educated with regards to OT process. Pt participated in OT goal planning. Pt pleasant & cooeprative thorughout the OT session & wants to regain his independence to return home. REQUIRES OT FOLLOW UP: Yes  Activity Tolerance: Patient Tolerated treatment well  Safety Devices in place: Yes  Type of devices: Call light within reach       Treatment Initiated: Pt educated with regards to elvis dressing strategies to facilitate pt performance. Pt educated with regards to safety during transfers. Pt further educated with regards to benefits of using [employer] arm RUE to facilitate pt AROM & funcitonal use RUE.      Goals  Long term goals  Time Frame for Long term goals : 4 weeks to address above problem areas  Long term goal 1: Pt demo independent to eat all meals  Long term goal 2: Pt demo Mod I grooming seated & sink level  Long term goal 3: Pt demo SBA to bathe @ shower level both seated

## 2019-08-06 NOTE — PROGRESS NOTES
[place]   BLE ROM WNL with exception of RLE AROM that is limited by weakness       BLE Strength RLE:  Iliopsoas: 2/5  Quad: 3-/5  Ant Tib: 0/5  LLE: grossly 5/5       Balance  Seated: CGA  Dynamic standing: Mod A with hemicane       Date Family Teach Completed No family present at initial evaluation       Is additional Family Teaching Needed? Y or N Yes       Hindering Progress Weakness, poor motor control       PT recommended ELOS 4 weeks       Team's Discharge Plan        Therapist at Team Meeting          Pt is alert and Oriented x4  Sensation: Denied numbness/tingling, reported symmetry to light touch sensation in BLE  Edema: None  Endurance: Good  Skin was inspected: BLE appear intact     ASSESSMENT  Pt displays functional ability as noted in the objective portion of this evaluation. Comments: The pt has excellent rehab potential, he is already demonstrating nearly full AROM of his R knee and is able to ambulate with fair R knee control. The pt had difficulty with sitting balance unsupported at EOB and required CGA due to RUE weakness and diminished motor control, improved to SBA by the end of the evaluation. The pt ambulated 2x25 feet with a hemicane, R arm sling, and sock on R shoe with Mod A, the pt is unable to clear the R foot at this time when ambulating. The pt is expected to make good progress during his time in acute rehab, his limited social support is the most concerning factor regarding discharge. Patient education  Pt educated on bed mobility, transfer technique, car transfer technique, stair negotiation sequencing, and sequencing with a cane when ambulating. Patient response to education:   Pt verbalized understanding Pt demonstrated skill Pt requires further education in this area   Yes Yes, reinforce Yes     Rehab potential is Good for reaching above PT goals. Pts/ family goals   1. \"I just want to walk again. \"    Patient and or family understand(s) diagnosis, prognosis, and plan of care: Yes    PLAN  PT care will be provided in accordance with the objectives noted above. Whenever appropriate, clear delegation orders will be provided for nursing staff. Exercises and functional mobility practice will be used as well as appropriate assistive devices or modalities to obtain goals. Patient and family education will also be administered as needed. Frequency of treatments will be 2x/day M-F and 1x/day Sat or Sun x  28 days. Time in: 1045  Time out: [address] Radha Her P.T.    License number:  [id number]

## 2019-08-06 NOTE — PROGRESS NOTES
participate in goal planning process. [] Goal planning not appropriate at this time as intervention was not recommended.        Prognosis for improvements is    [x] good          [] fair       [] guarded       [] poor        Antonio Busch., CCC-SLP/L  SP [id number]  8/6/2019

## 2019-08-06 NOTE — PROGRESS NOTES
NT  NT 4 inch step with AAD and SBA 7.5 inch step with AAD and Modified [place]   Picking up object off the floor NT  NT Will  an object with SBA Will  an object with Modified [place]   BLE ROM WNL with exception of RLE AROM that is limited by weakness  WNL with exception of RLE AROM that is limited by weakness     BLE Strength RLE:  Iliopsoas: 2/5  Quad: 3-/5  Ant Tib: 0/5  LLE: grossly 5/5  RLE:  Iliopsoas: 2/5  Quad: 3-/5  Ant Tib: 0/5  LLE: grossly 5/5     Balance  Seated: CGA  Dynamic standing: Mod A with hemicane  Seated: CGA  Dynamic standing: Mod A with hemicane     Date Family Teach Completed No family present at initial evaluation  No family present to this date     Is additional Family Teaching Needed? Y or N Yes  Yes     Hindering Progress Weakness, poor motor control  Weakness, poor motor control     PT recommended ELOS 4 weeks       Team's Discharge Plan        Therapist at Team Meeting          Therapeutic Exercise:   PM:   Ambulation: 2x30 feet with hemicane, R arm sling, and sock on R shoe with Mod A  Ambulation: 4x12 feet with hemicane, R arm sling, and sock on R shoe with Min A  Supine BLE Manual Resisted Exercises:   -Hip/knee extension x20 each  -Hip/knee flexion x20 each  Supine B glute bridges with manual resistance vs B hip abduction x12    Additional Comments: Emphasis on RLE strengthening during the afternoon session, the pt was reporting increased fatigue since the morning session but was motivated throughout the session and responded well to all instruction given. The pt continues to have difficulty clearing the R foot with R swing phase of gait, will trial a toe off brace soon. Patient/family education  Pt/family educated on gait, posture with standing activities, performing exercises in bed for RLE strengthening.      Patient/family response to education:   Pt/family verbalized understanding Pt/family demonstrated skill Pt/family requires further education in this area   Yes Yes Reinforce     PM  Time in: 1345  Time out: 1430    Pt is making good progress toward established Physical Therapy goals. Continue with physical therapy current plan of care. Atif Canales.  [facility], [address] [facility]  License Number: [id number]

## 2019-08-06 NOTE — PROGRESS NOTES
Occupational Therapy  OCCUPATIONAL THERAPY DAILY NOTE    Date:2019  Patient Name: Felix Hartley  MRN: [id number]  : 1955  Room: [address] [place], [place] [address]     Referring Practitioner: Abdi Raza MD  Diagnosis: CVA- L internal capsule & subcortical white matter  Additional Pertinent Hx: Hep C, HTN, HLD, CAD, hx angio 2009 & hx lumbar jah   Precautions: falls, puree    Functional Assessment:   Date Status AE  Comments   Feeding 19 Minimal Assist   Assist to open packages   Grooming 19 Moderate Assist   vc's to use R hand as a stabilizer & open small packages   Bathing 19 Moderate Assist      UB Dressing 19 Moderate Assist   vc's for elvis dressing strategies   LB Dressing 19 Maximal Assist   \"   Homemaking 19         Functional Transfers / Balance:   Date Status DME  Comments   Sit Balance 19 Stand by Assist   Seated EOB   Stand Balance 19 Minimal Assist   vc's for balance & safety   [] Tub  [] Shower   Transfer 19  TBA     Commode   Transfer 19 Moderate Assist   vc's for safety   Functional   Mobility 19 TBA      Other: supine>sit 19 Min A  vc's for technique     Functional Exercises / Activity:   Pt demo Min A stand pivot trf to the left & min vc's for safety & sequencing. Pt demo Min A sit<>supine on a mat. Pt tolerate RUE PROM/AAROM supine on mat with mod vc's to facilitate active movement in planes in which he can recruit muscles in a gravity eliminated plane. Sensory / Neuromuscular Re-Education:      Cognitive Skills:   Status Comments   Problem   Solving fair  During ADL   Memory good  \"   Sequencing fair  \"   Safety fair  \"     Visual Perception:    Education:  Pt educated with regards to OT process. Pt instructed on elvis dressing strategies.  Pt educated on AAROM exercises supine on mat that pt can perform in his room    [] Family teach completed on:    Pain Level: No c/o pain during OT     Additional Notes:       Patient has made good  progress during treatment sessions toward set goals. Therapy emphasis to obtain goals:ADL retraining, transfer training, functional mobility, therex, endurance/balance retraining, neuro reeducation, home management & pt/family education      [x] Continue with current OT Plan of care.   [] Prepare for Discharge     DISCHARGE RECOMMENDATIONS  Recommended DME:  SQBC, tub seat, RTS    Post Discharge Care:   []Home Independently  []Home with 24hr Care / Supervision []Home with Partial Supervision []Home with Home Health OT []Home with Out Pt OT []Other: ___   Comments:         Time in Time out Tx Time Breakdown  Variance:   First Session  eval+rx  [] Individual Tx-   [] Concurrent Tx -  [] Co-Tx -   [] Group Tx -   [] Time Missed -     Second Session [phone] [x] Individual Tx- 45  [] Concurrent Tx -  [] Co-Tx -   [] Group Tx -   [] Time Missed -     Third Session    [] Individual Tx-   [] Concurrent Tx -  [] Co-Tx -   [] Group Tx -   [] Time Missed -         Total Tx Time: [address] OTR/L [id number]

## 2019-08-06 NOTE — H&P
Post Admission Physician Evaluation      Patient Identification  Lorna Saldivar is a 61 y.o. male. :  1955  Admit Date:  2019  Attending Provider: Aaron Vital MD                                  Primary Care Physician:  Vanessa Tomlin DO   Consultants:  None  Admitting Diagnosis: Ischemic stroke [facility]) [I63.9]    Indication for Rehabilitation Admission:  Stroke:  01.2  Right Body Involvement (Left Brain)    The main medical problem(s) being actively managed by the physicians and requiring 24 hour rehabilitation nursing care during this stay include close neuro follow up, secondary stroke prevention, dvt prophylaxis, BP control, skin care. This patient has rehabilitation potential for functional improvement. The domains of functional impairment present in this patient which will require an intensive and interdisciplinary rehabilitation environment include self care, mobility, motor dysfunction, safety and communication. History of present illness: The patient is a 61 y.o. male with significant past medical history as outlined below who presented to [address] [place] [address] with the acute onset of (R) sided weakness and dysarthria. He had awoken with these symptoms. He was not a candidate for tPA. He had a CT of the head which showed findings consistent with microangiopathy and was transferred urgently to [facility]. He was seen by neurology and ECHO with bubble showed no significant abnormality. MRI of the brain showed no vascular abnormality but did show empty sella syndrome. He had a waxing and waning course and ultimately had a marked increase in (R) UE and LE weakness which held up his transfer to rehab last week. He now presents to inpatient rehab to increase his function, independence, safety and mobility prior to returning home alone as able. He is hopeful to be able to walk without assistance even if it is with an assistive device. He denies pain complaints.  No SOB or chest flexion strength  5/5   XII: tongue strength  Normal           Motor:  Right   1/5              Left   5/5               Right Bicep  0/5           Left Bicep  5/5              Right Triceps   1/5       Left Trceps  5/5          Right Deltoid  0/5     Left Deltoid  5/5         Right IPS  1/5            Left IPS  5/5               Right Quadriceps  3-/5          Left Quadriceps    5/5           Right Gastrocnemius    0/5    Left Gastrocnemius   2/5  Right Ant Tibialis   0/5  Left Ant Tibialis   5/5        Sensory:  normal to light touch (B) UE and LE and no extinction to DSS        Gait: unable to assess due to safety concerns      Coordination:   test for rapid alternating movements normal on (L) and not testable on (R)      DTR:   Right Brachioradialis reflex 1+  Left Brachioradialis reflex 1+  Right Biceps reflex 1+  Left Biceps reflex 0  Right Triceps reflex 1+  Left Triceps reflex 0  Right Quadriceps reflex 1+  Left Quadriceps reflex 1+  Right Achilles reflex 0  Left Achilles reflex 0      ASSESSMENT AND PLAN      Patient Active Problem List   Diagnosis    Ethmoidal sinusitis    Viral pharyngitis    Hepatitis C    Diverticula of colon    Ischemic heart disease    Hyperlipidemia    Lumbar spine strain    Hypertension    Arteriosclerotic heart disease (ASHD)    [facility]A [facility]cerebrovascular accident) [facility] [address] [facility]    CAD (coronary artery disease)    S/P coronary artery stent placement    Dysphagia    Ischemic stroke ([facility]       1:  Primary indication for inpatient rehabilitation admission over other settings:  Stroke:  01.2  Right Body Involvement (Left Brain)  2:  Comorbidities:  Comorbid Conditions in addition to those listed above [facility]  3. Estimated length of stay:  2-3 weeks  4. Anticipated disposition:  Home with [address] [place] [address]. The potential to achieve that is very good.   5:  Precautions:  falls, infections, skin and aspirations      Patient Active Problem List   Diagnosis    Ethmoidal

## 2019-08-07 PROCEDURE — 97535 SELF CARE MNGMENT TRAINING: CPT

## 2019-08-07 PROCEDURE — 97110 THERAPEUTIC EXERCISES: CPT

## 2019-08-07 PROCEDURE — 92526 ORAL FUNCTION THERAPY: CPT

## 2019-08-07 PROCEDURE — 6360000002 HC RX W HCPCS: Performed by: INTERNAL MEDICINE

## 2019-08-07 PROCEDURE — 6370000000 HC RX 637 (ALT 250 FOR IP): Performed by: PHYSICAL MEDICINE & REHABILITATION

## 2019-08-07 PROCEDURE — 1280000000 HC REHAB R&B

## 2019-08-07 PROCEDURE — 92507 TX SP LANG VOICE COMM INDIV: CPT

## 2019-08-07 PROCEDURE — 97530 THERAPEUTIC ACTIVITIES: CPT

## 2019-08-07 PROCEDURE — 97112 NEUROMUSCULAR REEDUCATION: CPT

## 2019-08-07 PROCEDURE — 6370000000 HC RX 637 (ALT 250 FOR IP): Performed by: INTERNAL MEDICINE

## 2019-08-07 RX ADMIN — ENOXAPARIN SODIUM 40 MG: 40 INJECTION SUBCUTANEOUS at 08:48

## 2019-08-07 RX ADMIN — CLOPIDOGREL 75 MG: 75 TABLET, FILM COATED ORAL at 08:48

## 2019-08-07 RX ADMIN — METOPROLOL SUCCINATE 50 MG: 50 TABLET, EXTENDED RELEASE ORAL at 08:48

## 2019-08-07 RX ADMIN — LISINOPRIL 20 MG: 20 TABLET ORAL at 08:48

## 2019-08-07 RX ADMIN — PANTOPRAZOLE SODIUM 20 MG: 20 TABLET, DELAYED RELEASE ORAL at 06:44

## 2019-08-07 RX ADMIN — ATORVASTATIN CALCIUM 40 MG: 40 TABLET, FILM COATED ORAL at 20:58

## 2019-08-07 RX ADMIN — ASPIRIN 81 MG: 81 TABLET, COATED ORAL at 08:49

## 2019-08-07 RX ADMIN — LISINOPRIL 20 MG: 20 TABLET ORAL at 20:58

## 2019-08-07 ASSESSMENT — PAIN SCALES - GENERAL
PAINLEVEL_OUTOF10: 0

## 2019-08-08 ENCOUNTER — TELEPHONE (OUTPATIENT)
Dept: PRIMARY CARE CLINIC | Age: 64
End: 2019-08-08

## 2019-08-08 PROCEDURE — 6360000002 HC RX W HCPCS: Performed by: INTERNAL MEDICINE

## 2019-08-08 PROCEDURE — 6370000000 HC RX 637 (ALT 250 FOR IP): Performed by: INTERNAL MEDICINE

## 2019-08-08 PROCEDURE — 97535 SELF CARE MNGMENT TRAINING: CPT

## 2019-08-08 PROCEDURE — 97110 THERAPEUTIC EXERCISES: CPT

## 2019-08-08 PROCEDURE — 97530 THERAPEUTIC ACTIVITIES: CPT

## 2019-08-08 PROCEDURE — 97112 NEUROMUSCULAR REEDUCATION: CPT

## 2019-08-08 PROCEDURE — 6370000000 HC RX 637 (ALT 250 FOR IP): Performed by: PHYSICAL MEDICINE & REHABILITATION

## 2019-08-08 PROCEDURE — 92507 TX SP LANG VOICE COMM INDIV: CPT

## 2019-08-08 PROCEDURE — 1280000000 HC REHAB R&B

## 2019-08-08 RX ADMIN — PANTOPRAZOLE SODIUM 20 MG: 20 TABLET, DELAYED RELEASE ORAL at 06:43

## 2019-08-08 RX ADMIN — ATORVASTATIN CALCIUM 40 MG: 40 TABLET, FILM COATED ORAL at 21:48

## 2019-08-08 RX ADMIN — METOPROLOL SUCCINATE 50 MG: 50 TABLET, EXTENDED RELEASE ORAL at 08:10

## 2019-08-08 RX ADMIN — LISINOPRIL 20 MG: 20 TABLET ORAL at 21:48

## 2019-08-08 RX ADMIN — ASPIRIN 81 MG: 81 TABLET, COATED ORAL at 08:10

## 2019-08-08 RX ADMIN — ENOXAPARIN SODIUM 40 MG: 40 INJECTION SUBCUTANEOUS at 08:10

## 2019-08-08 RX ADMIN — CLOPIDOGREL 75 MG: 75 TABLET, FILM COATED ORAL at 08:10

## 2019-08-08 RX ADMIN — LISINOPRIL 20 MG: 20 TABLET ORAL at 08:10

## 2019-08-08 ASSESSMENT — PAIN SCALES - GENERAL
PAINLEVEL_OUTOF10: 0
PAINLEVEL_OUTOF10: 0

## 2019-08-08 NOTE — PROGRESS NOTES
Physical Therapy    Facility/Department: [address] REHAB  Treatment Note    NAME: Avis Virk \"Fercho\"  : 1955  MRN: [id number]    Date of Service: 2019  Evaluating Therapist: Krunal Paul. Christophe Lanes, P.T.    ROOM: [facility]  DIAGNOSIS: Ischemic stroke  PRECAUTIONS: Fall risk, dysarthria, R side weakness   HPI: Pt presented to ED on  with R sided weakness, incoordination, and dysarthria. MRI on  revealed recent stroke of the L internal capsule, severe burden of chronic microvascular ischemic changes involving the periventricular and subcortical white matter. Social:  Pt lives alone in a 2 floor plan with 4-5 steps and 1 rail to enter with 7+7 steps and 1+0 rail to 2nd floor bedroom, 1/2 bath on 1st floor. Prior to admission pt ambulated with no AD and was Independent. Initial Evaluation  19 AM     PM    Short Term Goals Long Term Goals    Was pt agreeable to Eval/treatment? Yes Yes Yes     Does pt have pain? Denied Denied Denied     Bed Mobility  Rolling: Min A  Supine to sit: Min A  Sit to supine: Min A  Scooting: Min A Rolling: SBA  Supine<>sit: SBA  Scooting: SBA  (twin bed) NT Supervision Modified Independent   Transfers Sit to stand: Min A  Stand to sit: Min A  Stand pivot:  Mod A Sit to stand: CGA  Stand to sit: Min A  Stand pivot: Min A with hemicane Sit to stand: CGA  Stand to sit: Min A  Stand pivot: Min A with hemicane SBA Modified Independent   Ambulation   25 feet with hemicane, R arm sling, and sock on R shoe with Mod A 75 feet with hemicane, R arm sling, and R toe off brace with Min A 75 feet with hemicane, R arm sling, and R toe off brace with Min A 150 feet with AAD with SBA >150 feet with AAD with Modified [place]   Walking 10 feet on uneven surface NT NT NT 10 feet with AAD with SBA >10 feet with AAD with Modified [place]   Wheel Chair Mobility NT NT NT     Car Transfers Mod A Min A NT SBA Modified [place]   Stair negotiation: ascended and descended 4 steps with 1 reported no pain and there was no change in his neurological status. Patient/family education  Pt/family educated on weight shifting to the L prior to R swing phase, stand pivot transfer technique. Patient/family response to education:   Pt/family verbalized understanding Pt/family demonstrated skill Pt/family requires further education in this area   Yes Yes Reinforce     AM  Time in: 1045  Time out: 1130    PM  Time in: 1515  Time out: 1600    Pt is making good progress toward established Physical Therapy goals. Continue with physical therapy current plan of care. Augustina Caban.  Jackeline Stewart, [address] [place] [address]  License Number: [id number]

## 2019-08-08 NOTE — TELEPHONE ENCOUNTER
Sofi [facility] Transitions Initial Follow Up Call    Outreach made within 2 business days of discharge: Yes    Patient: Neto Zarate Patient : 1955   MRN: <[id number]>  Reason for Admission: There are no discharge diagnoses documented for the most recent discharge. Discharge Date: 19       Spoke with: left message on home and cell phone to call to set up a TCM    Discharge department/facility: [facility] Interactive Patient Contact:left message to call and set up appointment for a TCM visit.   Scheduled appointment with PCP within 7-14 days    Follow Up  Future Appointments   Date Time Provider Cindy Rossi   2019  8:30 AM DO BARBARA Mixon [place], [place]

## 2019-08-09 PROCEDURE — 97110 THERAPEUTIC EXERCISES: CPT

## 2019-08-09 PROCEDURE — 97112 NEUROMUSCULAR REEDUCATION: CPT

## 2019-08-09 PROCEDURE — 1280000000 HC REHAB R&B

## 2019-08-09 PROCEDURE — 97032 APPL MODALITY 1+ESTIM EA 15: CPT

## 2019-08-09 PROCEDURE — 6360000002 HC RX W HCPCS: Performed by: INTERNAL MEDICINE

## 2019-08-09 PROCEDURE — 92526 ORAL FUNCTION THERAPY: CPT

## 2019-08-09 PROCEDURE — 92507 TX SP LANG VOICE COMM INDIV: CPT

## 2019-08-09 PROCEDURE — 97530 THERAPEUTIC ACTIVITIES: CPT

## 2019-08-09 PROCEDURE — 6370000000 HC RX 637 (ALT 250 FOR IP): Performed by: PHYSICAL MEDICINE & REHABILITATION

## 2019-08-09 PROCEDURE — 6370000000 HC RX 637 (ALT 250 FOR IP): Performed by: INTERNAL MEDICINE

## 2019-08-09 RX ORDER — LANOLIN ALCOHOL/MO/W.PET/CERES
3 CREAM (GRAM) TOPICAL NIGHTLY
Status: DISCONTINUED | OUTPATIENT
Start: 2019-08-09 | End: 2019-08-10

## 2019-08-09 RX ADMIN — ATORVASTATIN CALCIUM 40 MG: 40 TABLET, FILM COATED ORAL at 21:22

## 2019-08-09 RX ADMIN — METOPROLOL SUCCINATE 50 MG: 50 TABLET, EXTENDED RELEASE ORAL at 09:18

## 2019-08-09 RX ADMIN — CLOPIDOGREL 75 MG: 75 TABLET, FILM COATED ORAL at 09:17

## 2019-08-09 RX ADMIN — PANTOPRAZOLE SODIUM 20 MG: 20 TABLET, DELAYED RELEASE ORAL at 07:07

## 2019-08-09 RX ADMIN — LISINOPRIL 20 MG: 20 TABLET ORAL at 21:23

## 2019-08-09 RX ADMIN — Medication 3 MG: at 21:22

## 2019-08-09 RX ADMIN — LISINOPRIL 20 MG: 20 TABLET ORAL at 09:17

## 2019-08-09 RX ADMIN — ENOXAPARIN SODIUM 40 MG: 40 INJECTION SUBCUTANEOUS at 09:17

## 2019-08-09 RX ADMIN — ASPIRIN 81 MG: 81 TABLET, COATED ORAL at 09:17

## 2019-08-09 NOTE — PROGRESS NOTES
with 1 rail with SBA >12 steps with 1 rail with Modified [place]   Curb Step:   ascended and descended NT NT NT 4 inch step with AAD and SBA 7.5 inch step with AAD and Modified [place]   Picking up object off the floor NT NT NT Will  an object with SBA Will  an object with Modified [place]   BLE ROM WNL with exception of RLE AROM that is limited by weakness WNL with exception of RLE AROM that is limited by weakness      BLE Strength RLE:  Iliopsoas: 2/5  Quad: 3-/5  Ant Tib: 0/5  LLE: grossly 5/5 RLE:  Iliopsoas: 2/5  Quad: 3-/5  Ant Tib: 0/5  LLE: grossly 5/5      Balance  Seated: CGA  Dynamic standing: Mod A with hemicane Seated: SBA  Dynamic standing: Min A with hemicane      Date Family Teach Completed No family present at initial evaluation No family present to this date      Is additional Family Teaching Needed? Y or N Yes Yes      Hindering Progress Weakness, poor motor control Weakness, poor motor control      PT recommended ELOS 4 weeks       Team's Discharge Plan        Therapist at Team Meeting          Therapeutic Exercise:   AM:   Ambulation: 10x10 feet with hemicane, R arm sling, and R toe off brace with CGA  Ambulation: 4x25 feet with hemicane, R arm sling, and R toe off brace with Min A  Stand pivot transfer x14 with hemicane with Min A  PM:   Ambulation: 4x75 feet with SBQC, R arm sling, and R toe off brace with CGA/Min A  Ambulation: 150 feet with hemciane, R arm sling, and R toe off brace with Min A  Agility ladder negotiation: R foot in each box with 2.5# weight on R foot through entire ladder x4 with CGA/Min A  R hip extensions on step with single UE support on rail x12 with CGA    Additional Comments: The pt continues to require significant cuing to perform stand pivot transfers correctly, he is advancing his R foot more consistently and no longer requires physical assistance on each step he takes.  The pt's AD was changed to a [facility] and the pt ambulated with similar

## 2019-08-09 NOTE — PROGRESS NOTES
Speech Language Pathology  ACUTE REHABILITATION--DAILY PROGRESS NOTE            FIMS SCORES                            Swallowing                          Current Status            5--Supervision / 4--Minimal Assistance               Short Term Goal         5--Supervision               Long Term Goal          6--Modified Independent              Receptive                          Current Status             6--Modified Independent                       Short Term Goal                                   Long Term Goal          7--Independent                Expressive                          Current Status             5--Supervision / 4--Minimal Assistance  Short Term Goal         5--Supervision   Long Term Goal          6--Modified Independent     Social Interaction                          Current Status             6--Modified Independent                       Short Term Goal                                   Long Term Goal          7--Independent                                                               Problem Solving                          Current Status             5--Supervision               Short Term Goal                                   Long Term Goal          6--Modified Independent                          Memory                          Current Status             5--Supervision               Short Term Goal                                   Long Term Goal          6--Modified Independent                          SWALLOWING:      Diet:  Dysphagia 1, Pureed solids with thin liquids     No difficulty observed with intake of prescribed consistencies. LANGUAGE:     Appropriate and timely responses to higher level questions during conversational tasks. COGNITION:       Good recall of information provided by SLP yesterday. SPEECH:     Fair/fair+ with unstructured conversation, unknown topic. Right orofacial weakness present.       Minimal-moderate distortions in conversational speech.

## 2019-08-10 PROCEDURE — 6360000002 HC RX W HCPCS: Performed by: INTERNAL MEDICINE

## 2019-08-10 PROCEDURE — 1280000000 HC REHAB R&B

## 2019-08-10 PROCEDURE — 97530 THERAPEUTIC ACTIVITIES: CPT

## 2019-08-10 PROCEDURE — 6370000000 HC RX 637 (ALT 250 FOR IP): Performed by: PHYSICAL MEDICINE & REHABILITATION

## 2019-08-10 PROCEDURE — 92526 ORAL FUNCTION THERAPY: CPT | Performed by: SPEECH-LANGUAGE PATHOLOGIST

## 2019-08-10 PROCEDURE — 6370000000 HC RX 637 (ALT 250 FOR IP): Performed by: INTERNAL MEDICINE

## 2019-08-10 PROCEDURE — 97535 SELF CARE MNGMENT TRAINING: CPT

## 2019-08-10 RX ADMIN — LISINOPRIL 20 MG: 20 TABLET ORAL at 09:53

## 2019-08-10 RX ADMIN — CLOPIDOGREL 75 MG: 75 TABLET, FILM COATED ORAL at 09:53

## 2019-08-10 RX ADMIN — LISINOPRIL 20 MG: 20 TABLET ORAL at 21:05

## 2019-08-10 RX ADMIN — PANTOPRAZOLE SODIUM 20 MG: 20 TABLET, DELAYED RELEASE ORAL at 07:00

## 2019-08-10 RX ADMIN — ASPIRIN 81 MG: 81 TABLET, COATED ORAL at 09:52

## 2019-08-10 RX ADMIN — METOPROLOL SUCCINATE 50 MG: 50 TABLET, EXTENDED RELEASE ORAL at 09:52

## 2019-08-10 RX ADMIN — ATORVASTATIN CALCIUM 40 MG: 40 TABLET, FILM COATED ORAL at 21:05

## 2019-08-10 RX ADMIN — ENOXAPARIN SODIUM 40 MG: 40 INJECTION SUBCUTANEOUS at 09:53

## 2019-08-10 ASSESSMENT — PAIN SCALES - GENERAL
PAINLEVEL_OUTOF10: 0
PAINLEVEL_OUTOF10: 0

## 2019-08-11 PROCEDURE — 6370000000 HC RX 637 (ALT 250 FOR IP): Performed by: INTERNAL MEDICINE

## 2019-08-11 PROCEDURE — 6360000002 HC RX W HCPCS: Performed by: INTERNAL MEDICINE

## 2019-08-11 PROCEDURE — 1280000000 HC REHAB R&B

## 2019-08-11 PROCEDURE — 97530 THERAPEUTIC ACTIVITIES: CPT

## 2019-08-11 PROCEDURE — 97535 SELF CARE MNGMENT TRAINING: CPT

## 2019-08-11 RX ADMIN — LISINOPRIL 20 MG: 20 TABLET ORAL at 20:52

## 2019-08-11 RX ADMIN — ENOXAPARIN SODIUM 40 MG: 40 INJECTION SUBCUTANEOUS at 08:42

## 2019-08-11 RX ADMIN — LISINOPRIL 20 MG: 20 TABLET ORAL at 08:43

## 2019-08-11 RX ADMIN — CLOPIDOGREL 75 MG: 75 TABLET, FILM COATED ORAL at 08:43

## 2019-08-11 RX ADMIN — METOPROLOL SUCCINATE 50 MG: 50 TABLET, EXTENDED RELEASE ORAL at 08:43

## 2019-08-11 RX ADMIN — ATORVASTATIN CALCIUM 40 MG: 40 TABLET, FILM COATED ORAL at 20:52

## 2019-08-11 RX ADMIN — ASPIRIN 81 MG: 81 TABLET, COATED ORAL at 08:43

## 2019-08-11 ASSESSMENT — PAIN SCALES - GENERAL: PAINLEVEL_OUTOF10: 0

## 2019-08-12 PROCEDURE — 97530 THERAPEUTIC ACTIVITIES: CPT

## 2019-08-12 PROCEDURE — 92507 TX SP LANG VOICE COMM INDIV: CPT

## 2019-08-12 PROCEDURE — 97110 THERAPEUTIC EXERCISES: CPT

## 2019-08-12 PROCEDURE — 6370000000 HC RX 637 (ALT 250 FOR IP): Performed by: INTERNAL MEDICINE

## 2019-08-12 PROCEDURE — 6360000002 HC RX W HCPCS: Performed by: INTERNAL MEDICINE

## 2019-08-12 PROCEDURE — 1280000000 HC REHAB R&B

## 2019-08-12 PROCEDURE — 97112 NEUROMUSCULAR REEDUCATION: CPT

## 2019-08-12 PROCEDURE — 97535 SELF CARE MNGMENT TRAINING: CPT

## 2019-08-12 RX ADMIN — LISINOPRIL 20 MG: 20 TABLET ORAL at 08:46

## 2019-08-12 RX ADMIN — ATORVASTATIN CALCIUM 40 MG: 40 TABLET, FILM COATED ORAL at 20:56

## 2019-08-12 RX ADMIN — METOPROLOL SUCCINATE 50 MG: 50 TABLET, EXTENDED RELEASE ORAL at 08:46

## 2019-08-12 RX ADMIN — CLOPIDOGREL 75 MG: 75 TABLET, FILM COATED ORAL at 08:46

## 2019-08-12 RX ADMIN — LISINOPRIL 20 MG: 20 TABLET ORAL at 20:56

## 2019-08-12 RX ADMIN — ASPIRIN 81 MG: 81 TABLET, COATED ORAL at 08:46

## 2019-08-12 RX ADMIN — ENOXAPARIN SODIUM 40 MG: 40 INJECTION SUBCUTANEOUS at 08:46

## 2019-08-12 ASSESSMENT — PAIN SCALES - GENERAL: PAINLEVEL_OUTOF10: 0

## 2019-08-12 NOTE — PROGRESS NOTES
Physical Therapy    [facility]  Treatment Note    NAME: Jaqueline Blum \"Fercho\"  : 1955  MRN: [id number]    Date of Service: 2019  Evaluating Therapist: Brook Orr P.T.    ROOM: [facility]  DIAGNOSIS: Ischemic stroke  PRECAUTIONS: Fall risk, dysarthria, R side weakness   HPI: Pt presented to ED on  with R sided weakness, incoordination, and dysarthria. MRI on  revealed recent stroke of the L internal capsule, severe burden of chronic microvascular ischemic changes involving the periventricular and subcortical white matter. Social:  Pt lives alone in a 2 floor plan with 4-5 steps and 1 rail to enter with 7+7 steps and 1+0 rail to 2nd floor bedroom, 1/2 bath on 1st floor. Prior to admission pt ambulated with no AD and was Independent. Initial Evaluation  19 AM     PM    Short Term Goals Long Term Goals    Was pt agreeable to Eval/treatment? Yes Yes Yes     Does pt have pain? Denied Denied Denied     Bed Mobility  Rolling: Min A  Supine to sit: Min A  Sit to supine: Min A  Scooting: Min A NT Supervision for all aspects    (twin bed) Supervision Modified Independent   Transfers Sit to stand: Min A  Stand to sit: Min A  Stand pivot:  Mod A Sit<>stand: SBA  Stand pivot: Min A with LBQC Sit<>stand: SBA  Stand pivot: Min A with LBQC SBA Modified Independent   Ambulation   25 feet with hemicane, R arm sling, and sock on R shoe with Mod A 75 feet with LBQC, R arm sling, R toe off brace with Min A 75 feet with LBQC, R toe off brace with Min A 150 feet with AAD with SBA >150 feet with AAD with Modified [place]   Walking 10 feet on uneven surface NT NT NT 10 feet with AAD with SBA >10 feet with AAD with Modified [place]   Wheel Chair Mobility NT NT NT     Car Transfers Mod A NT NT SBA Modified [place]   Stair negotiation: ascended and descended 4 steps with 1 rail with Mod A 4 steps with 1 rail with CGA NT 4 steps with 1 rail with SBA >12 steps with 1 rail

## 2019-08-12 NOTE — PROGRESS NOTES
Neuromuscular Re-Education:       Cognitive Skills:   Status Comments   Problem   Solving fair  During ADL   Memory good  \"   Sequencing fair  \"   Safety fair  \"     Visual Perception:    Education:  Pt educated with regards to elvis dressing strategies. Pt educated with regards to balance seated on tub bench when he is weight shifting to ensure pt safety due to impulsivity. Pt educated on RUE positioning. [] Family teach completed on:    Pain Level: No c/o pain during OT     Additional Notes:       Patient has made good  progress during treatment sessions toward set goals. Therapy emphasis to obtain goals:ADL retraining, transfer training, functional mobility, therex, endurance/balance retraining, neuro reeducation, home management & pt/family education      [x] Continue with current OT Plan of care.   [] Prepare for Discharge     DISCHARGE RECOMMENDATIONS  Recommended DME:  SQBC, tub seat, RTS    Post Discharge Care:   []Home Independently  []Home with 24hr Care / Supervision []Home with Partial Supervision []Home with Home Health OT []Home with Out Pt OT []Other: ___   Comments:         Time in Time out Tx Time Breakdown  Variance:   First Session   052 518 [x] Individual Tx-60  [] Concurrent Tx -   [] Co-Tx -   [] Group Tx -   [] Time Missed -     Second Session 1000 1045 [x] Individual Tx- 45  [] Concurrent Tx -  [] Co-Tx -   [] Group Tx -   [] Time Missed -     Third Session    [] Individual Tx-   [] Concurrent Tx -  [] Co-Tx -   [] Group Tx -   [] Time Missed -         Total Tx Time  105 mins    Kassandra Bhatia  OTR/L [id number]

## 2019-08-13 PROCEDURE — 6370000000 HC RX 637 (ALT 250 FOR IP): Performed by: PHYSICAL MEDICINE & REHABILITATION

## 2019-08-13 PROCEDURE — 1280000000 HC REHAB R&B

## 2019-08-13 PROCEDURE — 97530 THERAPEUTIC ACTIVITIES: CPT

## 2019-08-13 PROCEDURE — 6360000002 HC RX W HCPCS: Performed by: INTERNAL MEDICINE

## 2019-08-13 PROCEDURE — 97110 THERAPEUTIC EXERCISES: CPT

## 2019-08-13 PROCEDURE — 97112 NEUROMUSCULAR REEDUCATION: CPT

## 2019-08-13 PROCEDURE — 6370000000 HC RX 637 (ALT 250 FOR IP): Performed by: INTERNAL MEDICINE

## 2019-08-13 PROCEDURE — 92507 TX SP LANG VOICE COMM INDIV: CPT

## 2019-08-13 RX ORDER — PANTOPRAZOLE SODIUM 40 MG/1
20 GRANULE, DELAYED RELEASE ORAL
Status: DISCONTINUED | OUTPATIENT
Start: 2019-08-13 | End: 2019-08-17 | Stop reason: ALTCHOICE

## 2019-08-13 RX ADMIN — METOPROLOL SUCCINATE 50 MG: 50 TABLET, EXTENDED RELEASE ORAL at 08:23

## 2019-08-13 RX ADMIN — ENOXAPARIN SODIUM 40 MG: 40 INJECTION SUBCUTANEOUS at 08:22

## 2019-08-13 RX ADMIN — CLOPIDOGREL 75 MG: 75 TABLET, FILM COATED ORAL at 08:23

## 2019-08-13 RX ADMIN — ATORVASTATIN CALCIUM 40 MG: 40 TABLET, FILM COATED ORAL at 21:51

## 2019-08-13 RX ADMIN — ASPIRIN 81 MG: 81 TABLET, COATED ORAL at 08:23

## 2019-08-13 RX ADMIN — LISINOPRIL 20 MG: 20 TABLET ORAL at 08:23

## 2019-08-13 RX ADMIN — PANTOPRAZOLE SODIUM 20 MG: 40 GRANULE, DELAYED RELEASE ORAL at 06:25

## 2019-08-13 RX ADMIN — LISINOPRIL 20 MG: 20 TABLET ORAL at 21:51

## 2019-08-13 ASSESSMENT — PAIN SCALES - GENERAL: PAINLEVEL_OUTOF10: 0

## 2019-08-13 NOTE — PROGRESS NOTES
Physical Therapy    [address]Department[address]  Treatment Note    NAME: Artis Britton \"Fercho\"  : 1955  MRN: [id number]    Date of Service: 2019  Evaluating Therapist: Ashia Mars P.T.    ROOM: [facility]  DIAGNOSIS: Ischemic stroke  PRECAUTIONS: Fall risk, dysarthria, R side weakness   HPI: Pt presented to ED on  with R sided weakness, incoordination, and dysarthria. MRI on  revealed recent stroke of the L internal capsule, severe burden of chronic microvascular ischemic changes involving the periventricular and subcortical white matter. Social:  Pt lives alone in a 2 floor plan with 4-5 steps and 1 rail to enter with 7+7 steps and 1+0 rail to 2nd floor bedroom, 1/2 bath on 1st floor. Prior to admission pt ambulated with no AD and was Independent. Initial Evaluation  19 AM     PM    Short Term Goals Long Term Goals    Was pt agreeable to Eval/treatment? Yes Yes Yes     Does pt have pain? Denied Denied Denied     Bed Mobility  Rolling: Min A  Supine to sit: Min A  Sit to supine: Min A  Scooting: Min A Supervision for all aspects    (mat table) NT Supervision Modified Independent   Transfers Sit to stand: Min A  Stand to sit: Min A  Stand pivot:  Mod A Sit<>stand: SBA  Stand pivot: Min A with LBQC Sit<>stand: SBA  Stand pivot: CGA with LBQC SBA Modified Independent   Ambulation   25 feet with hemicane, R arm sling, and sock on R shoe with Mod A 150 feet with LBQC, R toe off brace with CGA/Min A 220 feet with LBQC, R toe off brace with CGA/Min A 150 feet with AAD with SBA >150 feet with AAD with Modified [place]   Walking 10 feet on uneven surface NT NT NT 10 feet with AAD with SBA >10 feet with AAD with Modified [place]   Wheel Chair Mobility NT NT NT     Car Transfers Mod A NT NT SBA Modified [place]   Stair negotiation: ascended and descended 4 steps with 1 rail with Mod A 4 steps with 1 rail with CGA 12 steps with 1 rail with SBA 4 steps with 1 rail with SBA >12 steps with 1 rail with Modified [place]   Curb Step:   ascended and descended NT NT NT 4 inch step with AAD and SBA 7.5 inch step with AAD and Modified [place]   Picking up object off the floor NT NT NT Will  an object with SBA Will  an object with Modified [place]   BLE ROM WNL with exception of RLE AROM that is limited by weakness WNL with exception of RLE AROM that is limited by weakness      BLE Strength RLE:  Iliopsoas: 2/5  Quad: 3-/5  Ant Tib: 0/5  LLE: grossly 5/5 RLE:  Iliopsoas: 2/5  Quad: 4-/5  Gastroc: 3-/5  Ant Tib: 0/5  LLE: grossly 5/5      Balance  Seated: CGA  Dynamic standing: Mod A with hemicane Seated: SBA  Dynamic standing: Min A with hemicane      Date Family Teach Completed No family present at initial evaluation No family present to this date      Is additional Family Teaching Needed? Y or N Yes Yes      Hindering Progress Weakness, poor motor control Weakness, poor motor control      PT recommended ELOS 4 weeks       Team's Discharge Plan        Therapist at Team Meeting          Therapeutic Exercise:   AM:   Ambulation: 4x75 feet with LBQC, R toe off brace, and CGA  Standing toe touches onto 2\" box with multicolored targets for RLE with [facility] and Min A  Supine B glute bridges x10  Supine R SLR x10  Supine self AAROM R ankle movement with orange thera-band  PM:   3 SPC step-overs x4 with LBQC with CGA  Agility ladder: forward and backward non-reciprocally with LBQC x2 forward, x2 backward with SBA<>CGA  Ambulation: 2x100 feet with LBQC with SBA  Ambulation: 1x200 feet with LBQC with SBA    Additional Comments: The pt had difficulty with RLE placement during ambulation and toe off brace was removed. The pt attended well to the RLE but required hip hiking and circumduction strategies to compensate for absent R anterior tibialis motor function. The pt's toe off brace was replaced. Reviewed exercises the pt can do in his room for RLE strength and motor control.

## 2019-08-13 NOTE — PROGRESS NOTES
placement     Dysphagia     Ischemic stroke ([facility] [address]      Plan/  1. Continue rehab program  2. Continue increased activity  3. Continue current medications  4.  Continue dvt prophylaxis          Marlee Shultz MD

## 2019-08-14 PROCEDURE — 1280000000 HC REHAB R&B

## 2019-08-14 PROCEDURE — 97112 NEUROMUSCULAR REEDUCATION: CPT

## 2019-08-14 PROCEDURE — 97110 THERAPEUTIC EXERCISES: CPT

## 2019-08-14 PROCEDURE — 97530 THERAPEUTIC ACTIVITIES: CPT

## 2019-08-14 PROCEDURE — 92507 TX SP LANG VOICE COMM INDIV: CPT

## 2019-08-14 PROCEDURE — 6370000000 HC RX 637 (ALT 250 FOR IP): Performed by: INTERNAL MEDICINE

## 2019-08-14 PROCEDURE — 6370000000 HC RX 637 (ALT 250 FOR IP): Performed by: PHYSICAL MEDICINE & REHABILITATION

## 2019-08-14 PROCEDURE — 92526 ORAL FUNCTION THERAPY: CPT

## 2019-08-14 PROCEDURE — 97032 APPL MODALITY 1+ESTIM EA 15: CPT

## 2019-08-14 PROCEDURE — 6360000002 HC RX W HCPCS: Performed by: INTERNAL MEDICINE

## 2019-08-14 RX ADMIN — METOPROLOL SUCCINATE 50 MG: 50 TABLET, EXTENDED RELEASE ORAL at 08:33

## 2019-08-14 RX ADMIN — LISINOPRIL 20 MG: 20 TABLET ORAL at 08:33

## 2019-08-14 RX ADMIN — LISINOPRIL 20 MG: 20 TABLET ORAL at 21:00

## 2019-08-14 RX ADMIN — ASPIRIN 81 MG: 81 TABLET, COATED ORAL at 08:33

## 2019-08-14 RX ADMIN — ATORVASTATIN CALCIUM 40 MG: 40 TABLET, FILM COATED ORAL at 21:00

## 2019-08-14 RX ADMIN — ENOXAPARIN SODIUM 40 MG: 40 INJECTION SUBCUTANEOUS at 08:33

## 2019-08-14 RX ADMIN — CLOPIDOGREL 75 MG: 75 TABLET, FILM COATED ORAL at 08:33

## 2019-08-14 RX ADMIN — PANTOPRAZOLE SODIUM 20 MG: 40 GRANULE, DELAYED RELEASE ORAL at 06:32

## 2019-08-14 ASSESSMENT — PAIN SCALES - GENERAL: PAINLEVEL_OUTOF10: 0

## 2019-08-14 NOTE — PROGRESS NOTES
Progress Note    Subjective/   61y.o. year old male on the rehab unit for stroke. Feels better without the melatonin. Tolerating therapy. A bit frustrated regarding persistent (R) weakness. Requesting an improved tray to support (R) UE. No SOB or chest pain. Objective/   VITALS:  BP (!) 151/72   Pulse 61   Temp 97.3 °F (36.3 °C) (Tympanic)   Resp 19   Ht 5' 10\" (1.778 m)   Wt 171 lb 14.4 oz (78 kg)   SpO2 93%   BMI 24.67 kg/m²   24HR INTAKE/OUTPUT:      Intake/Output Summary (Last 24 hours) at 8/13/2019 2337  Last data filed at 8/13/2019 1730  Gross per 24 hour   Intake 540 ml   Output 250 ml   Net 290 ml     Constitutional:  Alert, awake, no apparent distress   Cardiovascular:  S1, S2 without m/r/g   Respiratory:  CTA B without w/r/r   Abdomen: +BS  Ext: no pitting LE edema, no calf tenderness or cords. Neuro: dense (R) cole. Dysarthria and aphasia persist    Functional Level      Date   Status   AE    Comments     Feeding   8/12/19   MIn A             Grooming   8/12/19   Sup             Bathing   8/12/19   Min A             UB Dressing   8/12/19   Min A             LB Dressing   8/12/19   Mod A             Homemaking   8/6/19    TBA                  Functional Transfers / Balance:      Date Status DME  Comments   Sit Balance 8/12/19 Min A-dynamic       Stand Balance 8/12/19 Min A       [] Tub  [x] Shower   Transfer 8/12/19 Min A Tub bench      Commode   Transfer 8/13/19 Min Assist  Grab bar     Functional   Mobility 8/13/19 CGA Cole cane      Other: supine>sit     Bed to w/c  8/12/19 8/12/19  SBA         Min A           Functional Exercises / Activity:  Pt tolerated PROM/AAROM & resistance in planes in which pt has active movement RUE supine on the mat with G tolerance.      Sensory / Neuromuscular Re-Education:   Engaged in figure 8 arm scait, 3x10 reps of RUE to increase ROM. Pt required hand over hand assistance from LUE due to limited active motion.  Participated in standing ([facility]      Plan/  1. Continue rehab program  2. Increase activity as able  3. Continue dvt prophylaxis  4. Continue secondary stroke prevention  5. Will discuss with therapy about getting better support for (R) UE  6.  Control blood pressure - adjust medications if remains elevated          Nikolas Espinosa MD

## 2019-08-14 NOTE — PROGRESS NOTES
[place]   Curb Step:   ascended and descended NT NT NT 4 inch step with AAD and SBA 7.5 inch step with AAD and Modified [place]   Picking up object off the floor NT NT NT Will  an object with SBA Will  an object with Modified [place]   BLE ROM WNL with exception of RLE AROM that is limited by weakness WNL with exception of RLE AROM that is limited by weakness      BLE Strength RLE:  Iliopsoas: 2/5  Quad: 3-/5  Ant Tib: 0/5  LLE: grossly 5/5 RLE:  Iliopsoas: 2/5  Quad: 4-/5  Gastroc: 3-/5  Ant Tib: 0/5  LLE: grossly 5/5      Balance  Seated: CGA  Dynamic standing: Mod A with hemicane Seated: SBA  Dynamic standing: Min A with hemicane      Date Family Teach Completed No family present at initial evaluation No family present to this date      Is additional Family Teaching Needed? Y or N Yes Yes      Hindering Progress Weakness, poor motor control Weakness, poor motor control      PT recommended ELOS 4 weeks       Team's Discharge Plan        Therapist at Team Meeting          Therapeutic Exercise:   AM:   Standing toe touches onto 2\" box with multicolored targets for RLE with [facility] and Min A   3 SPC step-overs x4 with LBQC with CGA    Additional Comments: Pt  had a LOB x1 at bottom of steps with mod A to correct. Occasionally , pt will catch R foot on floor due to decreased motor control. Patient/family education  Pt/family educated on proper technique with stand pivot/transitioning to chair     Patient/family response to education:   Pt/family verbalized understanding Pt/family demonstrated skill Pt/family requires further education in this area   x X with occasional verbal cues  x     AM  Time in: 1045  Time out: 1130    Pt is making good progress toward established Physical Therapy goals. Continue with physical therapy current plan of care.     Rigo Hoff [id number]    Therapeutic Exercise:   PM:   Ambulation: 3x75 feet with LBQC, R arm sling, and R toe off brace with SBA and 1

## 2019-08-14 NOTE — PROGRESS NOTES
Speech Language Pathology  ACUTE REHABILITATION--DAILY PROGRESS NOTE            FIMS SCORES                            Swallowing                          Current Status            5--Supervision               Short Term Goal                   Long Term Goal          6--Modified Independent              Receptive                          Current Status             6--Modified Independent                       Short Term Goal                                   Long Term Goal          7--Independent                Expressive                          Current Status             5--Supervision   Short Term Goal           Long Term Goal          6--Modified Independent     Social Interaction                          Current Status             6--Modified Independent                       Short Term Goal                                   Long Term Goal          7--Independent                                                               Problem Solving                          Current Status             5--Supervision               Short Term Goal                                   Long Term Goal          6--Modified Independent                          Memory                          Current Status             5--Supervision               Short Term Goal                                   Long Term Goal          6--Modified Independent                          SWALLOWING:      Diet:  Minced and moist (NDD Level 2) and regular consistency liquids    No oral containment issues were identified. Slow but adequate oral prep and A-P propulsion of bolus were noted with good clearance of oral residuals. No overt clinical indicators of aspiration were apparent. LANGUAGE:     Appropriate and timely responses to higher level questions during conversational tasks. COGNITION:       Good recall of information provided by SLP yesterday. SPEECH:     Fair/fair+ with unstructured conversation, unknown topic.  Right orofacial

## 2019-08-15 PROCEDURE — 97530 THERAPEUTIC ACTIVITIES: CPT | Performed by: OCCUPATIONAL THERAPY ASSISTANT

## 2019-08-15 PROCEDURE — 97110 THERAPEUTIC EXERCISES: CPT

## 2019-08-15 PROCEDURE — 97032 APPL MODALITY 1+ESTIM EA 15: CPT | Performed by: OCCUPATIONAL THERAPY ASSISTANT

## 2019-08-15 PROCEDURE — 97530 THERAPEUTIC ACTIVITIES: CPT

## 2019-08-15 PROCEDURE — 97110 THERAPEUTIC EXERCISES: CPT | Performed by: OCCUPATIONAL THERAPY ASSISTANT

## 2019-08-15 PROCEDURE — 6370000000 HC RX 637 (ALT 250 FOR IP): Performed by: INTERNAL MEDICINE

## 2019-08-15 PROCEDURE — 1280000000 HC REHAB R&B

## 2019-08-15 PROCEDURE — 92526 ORAL FUNCTION THERAPY: CPT

## 2019-08-15 PROCEDURE — 6370000000 HC RX 637 (ALT 250 FOR IP): Performed by: PHYSICAL MEDICINE & REHABILITATION

## 2019-08-15 PROCEDURE — 6360000002 HC RX W HCPCS: Performed by: INTERNAL MEDICINE

## 2019-08-15 PROCEDURE — 97112 NEUROMUSCULAR REEDUCATION: CPT

## 2019-08-15 RX ORDER — WARFARIN SODIUM 3 MG/1
3 TABLET ORAL
Status: DISCONTINUED | OUTPATIENT
Start: 2019-08-15 | End: 2019-08-15

## 2019-08-15 RX ADMIN — ATORVASTATIN CALCIUM 40 MG: 40 TABLET, FILM COATED ORAL at 21:24

## 2019-08-15 RX ADMIN — CLOPIDOGREL 75 MG: 75 TABLET, FILM COATED ORAL at 07:53

## 2019-08-15 RX ADMIN — LISINOPRIL 20 MG: 20 TABLET ORAL at 07:53

## 2019-08-15 RX ADMIN — LISINOPRIL 20 MG: 20 TABLET ORAL at 21:24

## 2019-08-15 RX ADMIN — ASPIRIN 81 MG: 81 TABLET, COATED ORAL at 07:53

## 2019-08-15 RX ADMIN — METOPROLOL SUCCINATE 50 MG: 50 TABLET, EXTENDED RELEASE ORAL at 07:53

## 2019-08-15 RX ADMIN — ENOXAPARIN SODIUM 40 MG: 40 INJECTION SUBCUTANEOUS at 07:53

## 2019-08-15 RX ADMIN — PANTOPRAZOLE SODIUM 20 MG: 40 GRANULE, DELAYED RELEASE ORAL at 06:07

## 2019-08-15 ASSESSMENT — PAIN SCALES - GENERAL
PAINLEVEL_OUTOF10: 0

## 2019-08-15 NOTE — PROGRESS NOTES
sitting and assist with door management SBA Modified [place]   Stair negotiation: ascended and descended 4 steps with 1 rail with Mod A 4 steps with 1 rail with Min A 12 steps with 1 rail with SBA Difficulty clearing foot over edge of step both when ascending and descending 4 steps with 1 rail with SBA >12 steps with 1 rail with Modified [place]   BLE ROM WNL with exception of RLE AROM that is limited by weakness WNL with exception of RLE AROM that is limited by weakness WNL with exception of RLE AROM that is limited by weakness      BLE Strength RLE:  Iliopsoas: 2/5  Quad: 3-/5  Ant Tib: 0/5  LLE: grossly 5/5 RLE:  Iliopsoas: 2/5  Quad: 3-/5  Ant Tib: 0/5  LLE: grossly 5/5 RLE:  Iliopsoas: 2+/5  Quad: 4-/5  Gastroc: 3-/5  Ant Tib: 0/5  LLE: grossly 5/5      Balance  Seated: CGA  Dynamic standing: Mod A with hemicane Seated: SBA  Dynamic standing: Min A with hemicane Seated: Independent  Standing: SBA with LBQC      Date Family Teach Completed No family present at initial evaluation No family present to this date No family present to this date      Is additional Family Teaching Needed? Y or N Yes Yes Yes      Hindering Progress Weakness, poor motor control Weakness, poor motor control Weakness, poor motor control, impatient      PT recommended ELOS 4 weeks 3-4 weeks 2 weeks      Team's Discharge Plan  3 weeks 2 weeks      Therapist at [facility], Gm Trammell PT, DPT [id number]   Zuhair Trammell PT, DPT [id number]          Date:  8/9/19  Supporting factors:  Independent PLOF, motivated, good progress to this point  Barriers to discharge:  Poor motor control, hemiparesis, lack of social support  Additional comments: The pt is extremely motivated to get better and be able to walk again. The pt demonstrates some difficulty with motor planning as he attempts to step before weight shifting and turns quickly with a hemicane without placing the cane with correct sequencing but is progressing well.  The pt

## 2019-08-15 NOTE — PROGRESS NOTES
Physical Therapy    Facility/Department: [address] REHAB  Treatment Note    NAME: Young Urrutia \"Fercho\"  : 1955  MRN: [id number]    Date of Service: 8/15/2019  Evaluating Therapist: Blake Dunbar. Russel Ho PBETHANIE    ROOM: [facility]  DIAGNOSIS: Ischemic stroke  PRECAUTIONS: Fall risk, dysarthria, R side weakness   HPI: Pt presented to ED on  with R sided weakness, incoordination, and dysarthria. MRI on  revealed recent stroke of the L internal capsule, severe burden of chronic microvascular ischemic changes involving the periventricular and subcortical white matter. Social:  Pt lives alone in a 2 floor plan with 4-5 steps and 1 rail to enter with 7+7 steps and 1+0 rail to 2nd floor bedroom, 1/2 bath on 1st floor. Prior to admission pt ambulated with no AD and was Independent. Initial Evaluation  19 AM     PM    Short Term Goals Long Term Goals    Was pt agreeable to Eval/treatment? Yes Yes Yes     Does pt have pain? Denied Denied Denied     Bed Mobility  Rolling: Min A  Supine to sit: Min A  Sit to supine: Min A  Scooting: Min A NT Supervision      (mat table, R side) Supervision Modified Independent   Transfers Sit to stand: Min A  Stand to sit: Min A  Stand pivot:  Mod A Sit<>stand: Supervision  Stand pivot: SBA with LBQC Sit<>stand: Supervision  Stand pivot: SBA with LBQC SBA Modified Independent   Ambulation   25 feet with hemicane, R arm sling, and sock on R shoe with Mod A 250 feet with LBQC, R arm sling, and R toe off AFO with  feet with LBQC, R arm sling, and R toe off AFO with  feet with AAD with SBA >150 feet with AAD with Modified [place]   Walking 10 feet on uneven surface NT 30 feet through gravel and mulch with LBQC with SBA NT 10 feet with AAD with SBA >10 feet with AAD with Modified [place]   Wheel Chair Mobility NT NT NT     Car Transfers Mod A SBA NT SBA Modified [place]   Stair negotiation: ascended and descended 4 steps with 1 rail with Mod A NT 12 steps

## 2019-08-15 NOTE — PROGRESS NOTES
Progress Note    Subjective/   61y.o. year old male on the rehab unit for stroke. He did not get a new tray for his wheelchair. No SOB or chest pain. States worked hard today and is fatigued. Tolerating current medications. Objective/   VITALS:  /71   Pulse 75   Temp 98.6 °F (37 °C)   Resp 16   Ht 5' 10\" (1.778 m)   Wt 171 lb 14.4 oz (78 kg)   SpO2 97%   BMI 24.67 kg/m²   24HR INTAKE/OUTPUT:      Intake/Output Summary (Last 24 hours) at 8/14/2019 2116  Last data filed at 8/14/2019 1832  Gross per 24 hour   Intake 600 ml   Output 525 ml   Net 75 ml     Constitutional:  Alert, awake, no apparent distress   Cardiovascular:  S1, S2 without m/r/g   Respiratory:  CTA B without w/r/r   Abdomen: +BS  Ext: no pitting LE edema  Neuro: awake and alert, no tremor. Dense (R) elvis. Diet advanced as per SLP    Functional Level  Bed Mobility  Rolling: Min A  Supine to sit: Min A  Sit to supine: Min A  Scooting: Min A   Supervision Supervision Modified Independent   Transfers Sit to stand: Min A  Stand to sit: Min A  Stand pivot:  Mod A Sit to stand SBA  Stand to sit SBA    Sit<>stand: Supervision  Stand pivot: SBA with LBQC SBA Modified Independent   Ambulation   25 feet with hemicane, R arm sling, and sock on R shoe with Mod A 150 feet and 75 feet  with LBQC, R toe off brace with CGA/Min A 250 feet with LBQC, R toe off brace, R arm sling with  feet with AAD with SBA >150 feet with AAD with Modified [place]   Walking 10 feet on uneven surface NT NT NT 10 feet with AAD with SBA >10 feet with AAD with Modified [place]   Wheel Chair Mobility NT NT NT       Car Transfers Mod A NT NT SBA Modified [place]   Stair negotiation: ascended and descended 4 steps with 1 rail with Mod A 12 steps with unilateral rail with CGA NT 4 steps with 1 rail with SBA >12 steps with 1 rail with Modified [place]   Curb Step:   ascended and descended NT NT NT 4 inch step with AAD and SBA 7.5 inch step

## 2019-08-15 NOTE — PROGRESS NOTES
vc's for technique. Pt tolerate PROM/AAROM RUE supine on mat with min vc's for technique & to facilitate muscle activation RUE. Pt tolerate sitting balance exercises sitting EOM with G tolerance   Func estim to facilitate with and digit flexion and extension of RUE. Int- ext- 38 flex-35 end- 20 mins  10/20 reciprocal cycle. Cognitive Skills:   Status Comments   Problem   Solving fair  During ADL   Memory good  \"   Sequencing fair  \"   Safety fair  \"     Visual Perception:    Education:  Pt educated on R UE positioning and neuromuscular facilitation. [] Family teach completed on:    Pain Level: No c/o pain during OT     Additional Notes:       Patient has made good  progress during treatment sessions toward set goals. Therapy emphasis to obtain goals:ADL retraining, transfer training, functional mobility, therex, endurance/balance retraining, neuro reeducation, home management & pt/family education      [x] Continue with current OT Plan of care. [] Prepare for Discharge     DISCHARGE RECOMMENDATIONS  Recommended DME:  SQBC, tub seat, RTS    Post Discharge Care:   []Home Independently  []Home with 24hr Care / Supervision []Home with Partial Supervision []Home with Home Health OT []Home with Out Pt OT []Other: ___   Comments:         Time in Time out Tx Time Breakdown  Variance:   First Session  [phone] [x] Individual Tx-45 Minutes  [] Concurrent Tx -   [] Co-Tx -   [] Group Tx -   [] Time Missed -     Second Session [phone] [] Individual Tx-   [x] Concurrent Tx -45 mins  [] Co-Tx -   [] Group Tx -   [] Time Missed -     Third Session    [] Individual Tx-   [] Concurrent Tx -  [] Co-Tx -   [] Group Tx -   [] Time Missed -         Total Tx Time  90  Mins   Kassandra thorne OTR/L [facility] [place]/L [id number]      I have read & agree with the above status.     Varun Calixto OTR/L [id number]

## 2019-08-15 NOTE — PROGRESS NOTES
Speech Language Pathology  ACUTE REHABILITATION--DAILY PROGRESS NOTE            FIMS SCORES                            Swallowing                          Current Status            5--Supervision               Short Term Goal                   Long Term Goal          6--Modified Independent              Receptive                          Current Status             7--Independent                      Short Term Goal                                   Long Term Goal          Met               Expressive                          Current Status             5--Supervision   Short Term Goal           Long Term Goal          6--Modified Independent     Social Interaction                          Current Status             6--Modified Independent                       Short Term Goal                                   Long Term Goal          7--Independent                                                               Problem Solving                          Current Status             5--Supervision               Short Term Goal                                   Long Term Goal          6--Modified Independent                          Memory                          Current Status             6--Modified Independent              Short Term Goal                                   Long Term Goal          Met                         SWALLOWING:      Diet:  Minced and moist (NDD Level 2) and regular consistency liquids. Trial of soft consistency solids. No oral containment issues were identified. Slow but adequate oral prep and A-P propulsion of bolus were noted with good clearance of oral residuals. Cough x1 on ground beef-- patient reported meat was dry and small piece \"got stuck in throat\". No overt clinical indicators of aspiration were apparent. Recommend advance to Dysphagia 3, soft and bite sized consistency solids.      LANGUAGE:     Appropriate and timely responses to higher level questions during conversational

## 2019-08-16 PROCEDURE — 6360000002 HC RX W HCPCS: Performed by: INTERNAL MEDICINE

## 2019-08-16 PROCEDURE — 97032 APPL MODALITY 1+ESTIM EA 15: CPT | Performed by: OCCUPATIONAL THERAPY ASSISTANT

## 2019-08-16 PROCEDURE — 97110 THERAPEUTIC EXERCISES: CPT

## 2019-08-16 PROCEDURE — 97530 THERAPEUTIC ACTIVITIES: CPT

## 2019-08-16 PROCEDURE — 97110 THERAPEUTIC EXERCISES: CPT | Performed by: OCCUPATIONAL THERAPY ASSISTANT

## 2019-08-16 PROCEDURE — 97535 SELF CARE MNGMENT TRAINING: CPT

## 2019-08-16 PROCEDURE — 6370000000 HC RX 637 (ALT 250 FOR IP): Performed by: PHYSICAL MEDICINE & REHABILITATION

## 2019-08-16 PROCEDURE — 1280000000 HC REHAB R&B

## 2019-08-16 PROCEDURE — 6370000000 HC RX 637 (ALT 250 FOR IP): Performed by: INTERNAL MEDICINE

## 2019-08-16 RX ADMIN — ASPIRIN 81 MG: 81 TABLET, COATED ORAL at 08:51

## 2019-08-16 RX ADMIN — PANTOPRAZOLE SODIUM 20 MG: 40 GRANULE, DELAYED RELEASE ORAL at 06:48

## 2019-08-16 RX ADMIN — METOPROLOL SUCCINATE 50 MG: 50 TABLET, EXTENDED RELEASE ORAL at 08:52

## 2019-08-16 RX ADMIN — LISINOPRIL 20 MG: 20 TABLET ORAL at 08:52

## 2019-08-16 RX ADMIN — CLOPIDOGREL 75 MG: 75 TABLET, FILM COATED ORAL at 08:52

## 2019-08-16 RX ADMIN — ENOXAPARIN SODIUM 40 MG: 40 INJECTION SUBCUTANEOUS at 08:51

## 2019-08-16 RX ADMIN — ATORVASTATIN CALCIUM 40 MG: 40 TABLET, FILM COATED ORAL at 21:00

## 2019-08-16 RX ADMIN — LISINOPRIL 20 MG: 20 TABLET ORAL at 21:00

## 2019-08-16 ASSESSMENT — PAIN SCALES - GENERAL
PAINLEVEL_OUTOF10: 0

## 2019-08-16 NOTE — PROGRESS NOTES
RLE with a circumducted gait pattern, he was given a SBQC and relied less on the cane, causing him to take smaller and more controlled steps with better technique and less R toe drag. While the pt has been making progress rapidly he still has much to improve upon, he is still failing to clear his R foot regardless of gait pattern and requires assistance when not given VCs for cane placement and decreasing stride length. The pt was given a SPC and required assistance to recover from multiple LOB episodes. The pt was also allowed to ambulate with a modified 3-point gait pattern that caused multiple LOB episodes even over short distances. The pt would benefit from continued PT services in the acute rehab setting and remains a serious fall and re-admission risk due to his current deficits. Patient/family education  Pt/family educated on proper gait pattern. Patient/family response to education:   Pt/family verbalized understanding Pt/family demonstrated skill Pt/family requires further education in this area   Yes Yes Reinforce as needed     AM  Time in: 1045  Time out: 1130    PM  Time in: 1515  Time out: 1600    Pt is making good progress toward established Physical Therapy goals. Continue with physical therapy current plan of care. Marjan Randolph.  [employer] [place], [address] [place] [address]  License Number: [id number]

## 2019-08-17 PROCEDURE — 97535 SELF CARE MNGMENT TRAINING: CPT | Performed by: OCCUPATIONAL THERAPY ASSISTANT

## 2019-08-17 PROCEDURE — 97032 APPL MODALITY 1+ESTIM EA 15: CPT | Performed by: OCCUPATIONAL THERAPY ASSISTANT

## 2019-08-17 PROCEDURE — 97530 THERAPEUTIC ACTIVITIES: CPT

## 2019-08-17 PROCEDURE — 97110 THERAPEUTIC EXERCISES: CPT

## 2019-08-17 PROCEDURE — 99232 SBSQ HOSP IP/OBS MODERATE 35: CPT | Performed by: PHYSICAL MEDICINE & REHABILITATION

## 2019-08-17 PROCEDURE — 1280000000 HC REHAB R&B

## 2019-08-17 PROCEDURE — 6370000000 HC RX 637 (ALT 250 FOR IP): Performed by: PHYSICAL MEDICINE & REHABILITATION

## 2019-08-17 PROCEDURE — 6370000000 HC RX 637 (ALT 250 FOR IP): Performed by: INTERNAL MEDICINE

## 2019-08-17 PROCEDURE — 92526 ORAL FUNCTION THERAPY: CPT

## 2019-08-17 PROCEDURE — 6360000002 HC RX W HCPCS: Performed by: INTERNAL MEDICINE

## 2019-08-17 RX ORDER — PANTOPRAZOLE SODIUM 20 MG/1
20 TABLET, DELAYED RELEASE ORAL
Status: DISCONTINUED | OUTPATIENT
Start: 2019-08-17 | End: 2019-08-22 | Stop reason: HOSPADM

## 2019-08-17 RX ORDER — POLYVINYL ALCOHOL 14 MG/ML
2 SOLUTION/ DROPS OPHTHALMIC PRN
Status: DISCONTINUED | OUTPATIENT
Start: 2019-08-17 | End: 2019-08-22 | Stop reason: HOSPADM

## 2019-08-17 RX ADMIN — METOPROLOL SUCCINATE 50 MG: 50 TABLET, EXTENDED RELEASE ORAL at 09:10

## 2019-08-17 RX ADMIN — POLYVINYL ALCOHOL 2 DROP: 14 SOLUTION/ DROPS OPHTHALMIC at 19:39

## 2019-08-17 RX ADMIN — ASPIRIN 81 MG: 81 TABLET, COATED ORAL at 09:10

## 2019-08-17 RX ADMIN — CLOPIDOGREL 75 MG: 75 TABLET, FILM COATED ORAL at 09:10

## 2019-08-17 RX ADMIN — ATORVASTATIN CALCIUM 40 MG: 40 TABLET, FILM COATED ORAL at 22:06

## 2019-08-17 RX ADMIN — LISINOPRIL 20 MG: 20 TABLET ORAL at 09:10

## 2019-08-17 RX ADMIN — LISINOPRIL 20 MG: 20 TABLET ORAL at 22:06

## 2019-08-17 RX ADMIN — PANTOPRAZOLE SODIUM 20 MG: 40 GRANULE, DELAYED RELEASE ORAL at 06:19

## 2019-08-17 RX ADMIN — ENOXAPARIN SODIUM 40 MG: 40 INJECTION SUBCUTANEOUS at 09:10

## 2019-08-17 ASSESSMENT — PAIN SCALES - GENERAL: PAINLEVEL_OUTOF10: 0

## 2019-08-17 NOTE — PROGRESS NOTES
08/06/2019    BILITOT 1.0 08/06/2019       Lab Results   Component Value Date    COLORU yellow 08/26/2016    GLUCOSEU n 08/26/2016    KETUA n 08/26/2016    BILIRUBINUR n 08/26/2016     Lab Results   Component Value Date    LABA1C 5.5 08/02/2019       Functional Status:   Bed mobility: supervision  Transfers: SBA  Ambulation: 75 ft SPC R toe off brace Min A  Feeding: Min A  Grooming: Mod I  UB dressing: Mod I  LB dressing: CGA     Assessment/Plan:     61 y.o. male admitted to inpatient rehabilitation for stroke. -Stable condition  -Continue acute rehab program  -BP intermittently elevated, monitor, further medication adjustment if needed  -artificial tears PRN  -Continue DVT ppx  -Will try to get him a different w/c with a stable arm rest to attach his arm tray.     Electronically signed by Delonte Henry MD on 8/17/2019 at 12:33 PM

## 2019-08-17 NOTE — PROGRESS NOTES
Physical Therapy    Facility/Department: [address] REHAB  Treatment Note    NAME: Sebas Perez \"Fercho\"  : 1955  MRN: [id number]    Date of Service: 2019  Evaluating Therapist: Nora Soto P.T.    ROOM: [facility]  DIAGNOSIS: Ischemic stroke  PRECAUTIONS: Fall risk, dysarthria, R side weakness   HPI: Pt presented to ED on  with R sided weakness, incoordination, and dysarthria. MRI on  revealed recent stroke of the L internal capsule, severe burden of chronic microvascular ischemic changes involving the periventricular and subcortical white matter. Social:  Pt lives alone in a 2 floor plan with 4-5 steps and 1 rail to enter with 7+7 steps and 1+0 rail to 2nd floor bedroom, 1/2 bath on 1st floor. Prior to admission pt ambulated with no AD and was Independent. Initial Evaluation  19 AM         Short Term Goals Long Term Goals    Was pt agreeable to Eval/treatment? Yes Yes      Does pt have pain? Denied Denied      Bed Mobility  Rolling: Min A  Supine to sit: Min A  Sit to supine: Min A  Scooting: Min A Supervision      (mat table, R side)  Supervision Modified Independent   Transfers Sit to stand: Min A  Stand to sit: Min A  Stand pivot:  Mod A Sit<>stand: SBA  Stand pivot: SBA with SBQC  SBA Modified Independent   Ambulation   25 feet with hemicane, R arm sling, and sock on R shoe with Mod A 75 feet with SPC and R toe off brace with Min A  150 feet with AAD with SBA >150 feet with AAD with Modified [place]   Walking 10 feet on uneven surface NT NT  10 feet with AAD with SBA >10 feet with AAD with Modified [place]   Wheel Chair Mobility NT NT      Car Transfers Mod A NT  SBA Modified [place]   Stair negotiation: ascended and descended 4 steps with 1 rail with Mod A NT  4 steps with 1 rail with SBA >12 steps with 1 rail with Modified [place]   Curb Step:   ascended and descended NT NT  4 inch step with AAD and SBA 7.5 inch step with AAD and Modified [place] Picking up object off the floor NT NT  Will  an object with SBA Will  an object with Modified [place]   BLE ROM WNL with exception of RLE AROM that is limited by weakness WNL with exception of RLE AROM that is limited by weakness      BLE Strength RLE:  Iliopsoas: 2/5  Quad: 3-/5  Ant Tib: 0/5  LLE: grossly 5/5 RLE:  Iliopsoas: 3+/5  Quad: 4-/5  Gastroc: 3/5  Ant Tib: 1/5  LLE: grossly 5/5      Balance  Seated: CGA  Dynamic standing: Mod A with hemicane Seated: SBA  Dynamic standing: Min A with hemicane      Date Family Teach Completed No family present at initial evaluation No family present to this date      Is additional Family Teaching Needed? Y or N Yes Yes      Hindering Progress Weakness, poor motor control Weakness, poor motor control      PT recommended ELOS 4 weeks       Team's Discharge Plan        Therapist at Team Meeting          Therapeutic Exercise:   PM:   Supine R hip flexion with RLE extended off the side of the mat table vs manual resistance 3x12  Supine R ankle PF/DF with AAROM and tactile quick stretch PNF cues to promote R DF  Ambulation: 2x120 feet with KAMERON walker with R 2.5# ankle weight with Min A  Ambulation: 2x75 feet with SPC with Min A  Ambulation: 2x215 feet, 1x75 feet with SBQC with Min A with reciprocal gait pattern and modified 2-point gait pattern    Additional Comments: The pt continues to progress his gait pattern to a modified 2-point pattern requiring Min A due to reciprocal gait pattern leading to more significant LOB episodes from which the pt is not able to recover unassisted. There was some active dorsiflexion noted with supine R hip flexion exercises, the pt was unable to reproduce consistently with an isolated movement. Patient/family education  Pt/family educated on exercises to promote R ankle dorsiflexion, proper cane placement and step length during gait.      Patient/family response to education:   Pt/family verbalized understanding Pt/family demonstrated skill Pt/family requires further education in this area   Yes Yes Reinforce     PM  Time in: 1320  Time out: 1405    Pt is making good progress toward established Physical Therapy goals. Continue with physical therapy current plan of care. Lucius Awan.  Alexsandra Ramirez, [facility]  License Number: [id number]

## 2019-08-18 PROCEDURE — 97110 THERAPEUTIC EXERCISES: CPT

## 2019-08-18 PROCEDURE — 6370000000 HC RX 637 (ALT 250 FOR IP): Performed by: INTERNAL MEDICINE

## 2019-08-18 PROCEDURE — 97530 THERAPEUTIC ACTIVITIES: CPT | Performed by: OCCUPATIONAL THERAPY ASSISTANT

## 2019-08-18 PROCEDURE — 6360000002 HC RX W HCPCS: Performed by: INTERNAL MEDICINE

## 2019-08-18 PROCEDURE — 97110 THERAPEUTIC EXERCISES: CPT | Performed by: OCCUPATIONAL THERAPY ASSISTANT

## 2019-08-18 PROCEDURE — 1280000000 HC REHAB R&B

## 2019-08-18 PROCEDURE — 6370000000 HC RX 637 (ALT 250 FOR IP): Performed by: PHYSICAL MEDICINE & REHABILITATION

## 2019-08-18 PROCEDURE — 97530 THERAPEUTIC ACTIVITIES: CPT

## 2019-08-18 RX ADMIN — LISINOPRIL 20 MG: 20 TABLET ORAL at 21:32

## 2019-08-18 RX ADMIN — ASPIRIN 81 MG: 81 TABLET, COATED ORAL at 09:05

## 2019-08-18 RX ADMIN — METOPROLOL SUCCINATE 50 MG: 50 TABLET, EXTENDED RELEASE ORAL at 09:05

## 2019-08-18 RX ADMIN — POLYVINYL ALCOHOL 2 DROP: 14 SOLUTION/ DROPS OPHTHALMIC at 21:33

## 2019-08-18 RX ADMIN — LISINOPRIL 20 MG: 20 TABLET ORAL at 09:05

## 2019-08-18 RX ADMIN — ATORVASTATIN CALCIUM 40 MG: 40 TABLET, FILM COATED ORAL at 21:32

## 2019-08-18 RX ADMIN — ENOXAPARIN SODIUM 40 MG: 40 INJECTION SUBCUTANEOUS at 09:05

## 2019-08-18 RX ADMIN — CLOPIDOGREL 75 MG: 75 TABLET, FILM COATED ORAL at 09:05

## 2019-08-18 RX ADMIN — POLYVINYL ALCOHOL 2 DROP: 14 SOLUTION/ DROPS OPHTHALMIC at 09:06

## 2019-08-18 RX ADMIN — PANTOPRAZOLE SODIUM 20 MG: 20 TABLET, DELAYED RELEASE ORAL at 07:19

## 2019-08-18 ASSESSMENT — PAIN SCALES - GENERAL
PAINLEVEL_OUTOF10: 0
PAINLEVEL_OUTOF10: 0

## 2019-08-18 NOTE — PROGRESS NOTES
Physical Therapy    Facility/Department: [address] REHAB  Treatment Note    NAME: Jalen Ramires \"Fercho\"  : 1955  MRN: [id number]    Date of Service: 2019  Evaluating Therapist: Anum Waldrop. Ezekiel Tom P.T.    ROOM: [facility]  DIAGNOSIS: Ischemic stroke  PRECAUTIONS: Fall risk, dysarthria, R side weakness   HPI: Pt presented to ED on  with R sided weakness, incoordination, and dysarthria. MRI on  revealed recent stroke of the L internal capsule, severe burden of chronic microvascular ischemic changes involving the periventricular and subcortical white matter. Social:  Pt lives alone in a 2 floor plan with 4-5 steps and 1 rail to enter with 7+7 steps and 1+0 rail to 2nd floor bedroom, 1/2 bath on 1st floor. Prior to admission pt ambulated with no AD and was Independent. Initial Evaluation  19 AM         Short Term Goals Long Term Goals    Was pt agreeable to Eval/treatment? Yes Yes      Does pt have pain? Denied Denied      Bed Mobility  Rolling: Min A  Supine to sit: Min A  Sit to supine: Min A  Scooting: Min A Supervision      (mat table, R side)  Supervision Modified Independent   Transfers Sit to stand: Min A  Stand to sit: Min A  Stand pivot:  Mod A Sit<>stand: SBA  Stand pivot: SBA with SBQC  SBA Modified Independent   Ambulation   25 feet with hemicane, R arm sling, and sock on R shoe with Mod A 75 feet with SPC and R toe off brace with Min A  150 feet with AAD with SBA >150 feet with AAD with Modified [place]   Walking 10 feet on uneven surface NT NT  10 feet with AAD with SBA >10 feet with AAD with Modified [place]   Wheel Chair Mobility NT NT      Car Transfers Mod A NT  SBA Modified [place]   Stair negotiation: ascended and descended 4 steps with 1 rail with Mod A 4 steps with 1 rail with SBA  4 steps with 1 rail with SBA >12 steps with 1 rail with Modified [place]   Curb Step:   ascended and descended NT NT  4 inch step with AAD and SBA 7.5 inch step with AAD and Modified [place]   Picking up object off the floor NT NT  Will  an object with SBA Will  an object with Modified [place]   BLE ROM WNL with exception of RLE AROM that is limited by weakness WNL with exception of RLE AROM that is limited by weakness      BLE Strength RLE:  Iliopsoas: 2/5  Quad: 3-/5  Ant Tib: 0/5  LLE: grossly 5/5 RLE:  Iliopsoas: 3+/5  Quad: 4-/5  Gastroc: 3/5  Ant Tib: 1/5  LLE: grossly 5/5      Balance  Seated: CGA  Dynamic standing: Mod A with hemicane Seated: SBA  Dynamic standing: Min A with hemicane      Date Family Teach Completed No family present at initial evaluation No family present to this date      Is additional Family Teaching Needed? Y or N Yes Yes      Hindering Progress Weakness, poor motor control Weakness, poor motor control      PT recommended ELOS 4 weeks       Team's Discharge Plan        Therapist at Team Meeting          Therapeutic Exercise:   PM:   Supine PROM to RLE to include:  -HS stretching 2x30 seconds  -Heel cord stretching 2x30 seconds  -PROM progressing to AAROM to R hip/knee joint  Supine RLE Manual Resisted Exercises:   -Hip/knee extension x15   -Hip/knee flexion x15  -Straight leg hip extension x15  -Hip abduction: x15  RLE D1 flexion/extension PROM progressing to manual resistance x30  Ambulation: 2x75 feet with SPC with Min A  Ambulation: 2x215 feet with SBQC with Min A with reciprocal gait pattern, working toward a 2-point gait pattern  Agility ladder: forward and backward non-reciprocal x3 each direction with SBQC with CGA and 1 LOB with Mod A to correct    Additional Comments: The pt required some assistance with ambulation and lost his balance on 2 occasions requiring therapist assistance to prevent a fall. The pt is losing his balance due to advancement of gait and progression to a SPC vs a SBQC.  The pt continues to ambulate with the [facility] but is attempting to advance to a 2-point gait pattern with the cane and it is causing him to

## 2019-08-19 PROCEDURE — 6370000000 HC RX 637 (ALT 250 FOR IP): Performed by: INTERNAL MEDICINE

## 2019-08-19 PROCEDURE — 97112 NEUROMUSCULAR REEDUCATION: CPT

## 2019-08-19 PROCEDURE — 1280000000 HC REHAB R&B

## 2019-08-19 PROCEDURE — 97110 THERAPEUTIC EXERCISES: CPT

## 2019-08-19 PROCEDURE — 97530 THERAPEUTIC ACTIVITIES: CPT

## 2019-08-19 PROCEDURE — 6370000000 HC RX 637 (ALT 250 FOR IP): Performed by: PHYSICAL MEDICINE & REHABILITATION

## 2019-08-19 PROCEDURE — 97535 SELF CARE MNGMENT TRAINING: CPT

## 2019-08-19 PROCEDURE — 6360000002 HC RX W HCPCS: Performed by: INTERNAL MEDICINE

## 2019-08-19 RX ORDER — ASPIRIN 81 MG/1
81 TABLET ORAL DAILY
Qty: 30 TABLET | Refills: 3 | COMMUNITY
Start: 2019-08-19

## 2019-08-19 RX ORDER — LISINOPRIL 20 MG/1
20 TABLET ORAL 2 TIMES DAILY
Qty: 30 TABLET | Refills: 3 | Status: SHIPPED | OUTPATIENT
Start: 2019-08-19

## 2019-08-19 RX ORDER — ATORVASTATIN CALCIUM 40 MG/1
40 TABLET, FILM COATED ORAL NIGHTLY
Qty: 30 TABLET | Refills: 3 | Status: SHIPPED | OUTPATIENT
Start: 2019-08-19

## 2019-08-19 RX ORDER — MAGNESIUM HYDROXIDE/ALUMINUM HYDROXICE/SIMETHICONE 120; 1200; 1200 MG/30ML; MG/30ML; MG/30ML
30 SUSPENSION ORAL 4 TIMES DAILY PRN
Qty: 1 BOTTLE | Refills: 0 | COMMUNITY
Start: 2019-08-19

## 2019-08-19 RX ORDER — PANTOPRAZOLE SODIUM 20 MG/1
20 TABLET, DELAYED RELEASE ORAL
Qty: 30 TABLET | Refills: 3 | Status: SHIPPED | OUTPATIENT
Start: 2019-08-20

## 2019-08-19 RX ORDER — POLYVINYL ALCOHOL 14 MG/ML
2 SOLUTION/ DROPS OPHTHALMIC PRN
Qty: 1 BOTTLE | Refills: 4 | Status: SHIPPED | OUTPATIENT
Start: 2019-08-19 | End: 2019-09-18

## 2019-08-19 RX ORDER — SENNA PLUS 8.6 MG/1
1 TABLET ORAL DAILY PRN
COMMUNITY
Start: 2019-08-19 | End: 2019-09-18

## 2019-08-19 RX ADMIN — LISINOPRIL 20 MG: 20 TABLET ORAL at 09:25

## 2019-08-19 RX ADMIN — PANTOPRAZOLE SODIUM 20 MG: 20 TABLET, DELAYED RELEASE ORAL at 06:53

## 2019-08-19 RX ADMIN — ENOXAPARIN SODIUM 40 MG: 40 INJECTION SUBCUTANEOUS at 09:25

## 2019-08-19 RX ADMIN — ATORVASTATIN CALCIUM 40 MG: 40 TABLET, FILM COATED ORAL at 20:42

## 2019-08-19 RX ADMIN — LISINOPRIL 20 MG: 20 TABLET ORAL at 20:42

## 2019-08-19 RX ADMIN — CLOPIDOGREL 75 MG: 75 TABLET, FILM COATED ORAL at 09:25

## 2019-08-19 RX ADMIN — METOPROLOL SUCCINATE 50 MG: 50 TABLET, EXTENDED RELEASE ORAL at 09:25

## 2019-08-19 RX ADMIN — ASPIRIN 81 MG: 81 TABLET, COATED ORAL at 09:25

## 2019-08-19 ASSESSMENT — PAIN SCALES - GENERAL
PAINLEVEL_OUTOF10: 0
PAINLEVEL_OUTOF10: 0

## 2019-08-19 NOTE — PROGRESS NOTES
the steps he took requiring self-correction or even Min A to prevent a LOB with the [facility] and the [facility] as well. The pt also lost his balance multiple times when negotiating canes that were laying on the ground. The pt's wife was present for the afternoon session and was able to guard the pt for all functional mobility with extensive cuing from the therapist. The pt's wife provided CGA for stair negotiation and car transfer, the pt descended the steps retrograde as he reports he will only have 1 rail when returning home as his wife does not think she will be able to have a 2nd rail installed if he is to be discharged home this week. The pt's wife was anxious throughout the session and feels she will not be able to provide the care he needs at this time if he is to be discharged this week. Patient/family education  Pt/family educated on correct cane placement, decreasing L step length during ambulation. Patient/family response to education:   Pt/family verbalized understanding Pt/family demonstrated skill Pt/family requires further education in this area   Yes Yes, with cues Yes     AM  Time in: 1045  Time out: 1130    PM  Time in: 1430  Time out: 1515    Pt is making good progress toward established Physical Therapy goals. Continue with physical therapy current plan of care. Roxanna Wadsworth.  Lo Sorensen, [facility]  License Number: [id number]

## 2019-08-19 NOTE — PROGRESS NOTES
Occupational Therapy  OCCUPATIONAL THERAPY DAILY NOTE    Date:2019  Patient Name: Julien Evans  MRN: [id number]  : 1955  Room: [address] [place], [place] [address]     Referring Practitioner: Bridget Coreas   Diagnosis: CVA- L internal capsule & subcortical white matter  Additional Pertinent Hx: Hep C, HTN, HLD, CAD, hx angio 2009 & hx lumbar jah   Precautions: falls, puree    Functional Assessment:   Date Status AE  Comments   Feeding 19 MIn A     Grooming 19 Mod I     Bathing 19 Min A     UB Dressing 19 Mod I     LB Dressing 19  CGA  Pt donned and doffed shoe, sock and AFO brace to RLE by crossing leg over technique    Homemaking 19  TBA       Functional Transfers / Balance:   Date Status DME  Comments   Sit Balance 19 SBA     Stand Balance 19 Min/CGA  Standing to pull up pants    [x] Tub    [x] Shower   Transfer 19  Min A         Min A  SBQC, extended tub bench       Tub bench  Verbal cues for safe technique and hand placement     Commode   Transfer 19  Min Assist  Grab bar    Functional   Mobility 19  CGA-occ Min A SBQC  Throughout therapy apt over tile and carpet floor surfaces with pt needing occasional Min A for balance when ambulating in smaller spaces around furniture    Other:on/off recliner, couch and standard queen bed  19  Min A  SBQC  Assist with sit to stand from lower soft surfaces    Functional Exercises / Activity:      Sensory / Neuromuscular Re-Education:  -PROM completed seated in chair with all planes with focus on increasing R UE ROM and maintain joint integrity and provide stretching at elbow/shoulder. -Arm skate completed seated at table with R UE with focus on shoulder flex/ext and horizontal abduction. Pt required physical assist to decrease compensatory strategies.  -Tabletop pushups completed standing at table with focus on weightbearing through R UE, strength and dynamic balance. Completed 3x20 reps with fair tolerance.

## 2019-08-20 ENCOUNTER — APPOINTMENT (OUTPATIENT)
Dept: MRI IMAGING | Age: 64
DRG: 057 | End: 2019-08-20
Attending: PHYSICAL MEDICINE & REHABILITATION
Payer: COMMERCIAL

## 2019-08-20 PROCEDURE — 6370000000 HC RX 637 (ALT 250 FOR IP): Performed by: INTERNAL MEDICINE

## 2019-08-20 PROCEDURE — 97530 THERAPEUTIC ACTIVITIES: CPT

## 2019-08-20 PROCEDURE — 1280000000 HC REHAB R&B

## 2019-08-20 PROCEDURE — 97535 SELF CARE MNGMENT TRAINING: CPT

## 2019-08-20 PROCEDURE — 70551 MRI BRAIN STEM W/O DYE: CPT

## 2019-08-20 PROCEDURE — 6370000000 HC RX 637 (ALT 250 FOR IP): Performed by: PHYSICAL MEDICINE & REHABILITATION

## 2019-08-20 PROCEDURE — 97110 THERAPEUTIC EXERCISES: CPT

## 2019-08-20 PROCEDURE — 6360000002 HC RX W HCPCS: Performed by: INTERNAL MEDICINE

## 2019-08-20 PROCEDURE — 92507 TX SP LANG VOICE COMM INDIV: CPT

## 2019-08-20 RX ADMIN — METOPROLOL SUCCINATE 50 MG: 50 TABLET, EXTENDED RELEASE ORAL at 08:54

## 2019-08-20 RX ADMIN — ENOXAPARIN SODIUM 40 MG: 40 INJECTION SUBCUTANEOUS at 08:54

## 2019-08-20 RX ADMIN — ATORVASTATIN CALCIUM 40 MG: 40 TABLET, FILM COATED ORAL at 22:07

## 2019-08-20 RX ADMIN — LISINOPRIL 20 MG: 20 TABLET ORAL at 22:03

## 2019-08-20 RX ADMIN — LISINOPRIL 20 MG: 20 TABLET ORAL at 08:54

## 2019-08-20 RX ADMIN — CLOPIDOGREL 75 MG: 75 TABLET, FILM COATED ORAL at 08:54

## 2019-08-20 RX ADMIN — ASPIRIN 81 MG: 81 TABLET, COATED ORAL at 08:54

## 2019-08-20 RX ADMIN — PANTOPRAZOLE SODIUM 20 MG: 20 TABLET, DELAYED RELEASE ORAL at 06:35

## 2019-08-20 ASSESSMENT — PAIN SCALES - GENERAL
PAINLEVEL_OUTOF10: 0
PAINLEVEL_OUTOF10: 0

## 2019-08-20 NOTE — PROGRESS NOTES
Occupational Therapy  OCCUPATIONAL THERAPY DAILY NOTE    Date:2019  Patient Name: Jean-Pierre Reynoso  MRN: [id number]  : 1955  Room: [address] [place], [place] [address]     Referring Practitioner: Leopold Jenkins   Diagnosis: CVA- L internal capsule & subcortical white matter  Additional Pertinent Hx: Hep C, HTN, HLD, CAD, hx angio 2009 & hx lumbar jah   Precautions: falls, puree    Functional Assessment:   Date Status AE  Comments   Feeding 19 MIn A     Grooming 19 Mod I  Pt seated to brush teet, comb hair, and wash face/hair    Bathing 19 SBA Shower bench, hand held shower  Pt completed full shower    UB Dressing 19 Mod I  To og pull over shirt    LB Dressing 19  CGA  To og/doff underwear, pants, socks, shoes with AFO    Homemaking 19  TBA       Functional Transfers / Balance:   Date Status DME  Comments   Sit Balance 19 SBA Shower bench Pt demo'd during ADL    Stand Balance 19 Min/CGA  demo'd during ADL tasks, and transfer   [x] Tub        [x] Shower   Transfer 19  Min A         Min A  SBQC, extended tub bench     Shower  bench, SBQC         Pt requiring v/c's for control during descent to bench    Commode   Transfer 19  Min Assist  Grab bar    Functional   Mobility 19  CGA-occ Min A SBQC      Other:on/off recliner, couch and standard queen bed  19  Min A  SBQC     Functional Exercises / Activity:      Sensory / Neuromuscular Re-Education:        Cognitive Skills:   Status Comments   Problem   Solving fair  During ADL   Memory good  \"   Sequencing fair  \"   Safety fair  \"     Visual Perception:    Education:  Pt educated on R UE positioning and SROM. Pt educated on hand placement during sit to stand transfers     [x] Family teach completed on:19 Pt's Wife present for family teach session. She participated hands on as pt completed functional transfers and mobility throughout therapy apt setting at [facility].   She was educated on types of DME for

## 2019-08-20 NOTE — PROGRESS NOTES
Patient and wife expressed concerns of pt having an increased slur and increased level of lethargy. Nurse completed a complete neurological exam and noted no change to patient neurologically or cogitatively. Patient awake and talking at his normal baseline, having lunch. Contacted attending in regard to patient and family concern, new order placed for STAT MRI of Brain wo Contrast.  Patient and wife updated to order at this time. Both expressed appreciation for the new order to be placed.

## 2019-08-20 NOTE — PROGRESS NOTES
(177.8 cm)   · Current Body Wt: 171 lb (77.6 kg)(8/5 actual - UTO d/t pt in chair at bedside )  · Admission Body Wt: 171 lb (77.6 kg)(8/5/19, no method)  · Usual Body Wt: 186 lb (84.4 kg)(3/2019 OV per EMR)  · % Weight Change:  8% wt loss x 5 months  · Ideal Body Wt: 166 lb (75.3 kg), % Ideal Body 103%  · BMI Classification: BMI 18.5 - 24.9 Normal Weight    Nutrition Interventions:   Continue current diet, Discontinue ONS  Continued Inpatient Monitoring, Education Initiated, Coordination of Care(ONS options/preferences.  Pt requesting for ONS to be d/c and declined  need for alternative ONS at this time)    Nutrition Evaluation:   · Evaluation: Goals set   · Goals: Pt to consume 75% or greater of most meals    · Monitoring: Meal Intake, Diet Tolerance, Skin Integrity, I&O, Mental Status/Confusion, Weight, Pertinent Labs, Chewing/Swallowing, Monitor Bowel Function      Electronically signed by Despina Goltz, MS, RD, LD on 8/20/19 at 3:14 PM    Contact Number: [phone]

## 2019-08-20 NOTE — PROGRESS NOTES
Physical Therapy    Facility/Department: [address] REHAB  Treatment Note    NAME: Marcella Bro \"Fercho\"  : 1955  MRN: [id number]    Date of Service: 2019  Evaluating Therapist: Joellen Hercules P.T.    ROOM: [facility]  DIAGNOSIS: Ischemic stroke  PRECAUTIONS: Fall risk, dysarthria, R side weakness   HPI: Pt presented to ED on  with R sided weakness, incoordination, and dysarthria. MRI on  revealed recent stroke of the L internal capsule, severe burden of chronic microvascular ischemic changes involving the periventricular and subcortical white matter. Social:  Pt lives alone in a 2 floor plan with 4-5 steps and 1 rail to enter with 7+7 steps and 1+0 rail to 2nd floor bedroom, 1/2 bath on 1st floor. Prior to admission pt ambulated with no AD and was Independent. Initial Evaluation  19 AM     PM    Short Term Goals Long Term Goals    Was pt agreeable to Eval/treatment? Yes Yes Yes     Does pt have pain? Denied Denied Denied     Bed Mobility  Rolling: Min A  Supine to sit: Min A  Sit to supine: Min A  Scooting: Min A NT Supervision for all aspects Supervision Modified Independent   Transfers Sit to stand: Min A  Stand to sit: Min A  Stand pivot:  Mod A Sit<>stand: SBA  Stand pivot: SBA with SBQC Sit<>stand: SBA  Stand pivot: SBA with SBQC SBA Modified [place]   Ambulation   25 feet with hemicane, R arm sling, and sock on R shoe with Mod A 90 feet with SBQC and R toe off brace with Min A outside over multiple surfaces 120 feet with SBQC and R toe off brace with CGA/Min A 150 feet with AAD with SBA >150 feet with AAD with Modified [place]   Walking 10 feet on uneven surface NT 30 feet over mulch and gravel with SBQC with Min A NT 10 feet with AAD with SBA >10 feet with AAD with Modified [place]   Wheel Chair Mobility NT NT NT     Car Transfers Mod A SBA SBA SBA Modified [place]   Stair negotiation: ascended and descended 4 steps with 1 rail with Mod A 4 steps with 1 rail

## 2019-08-20 NOTE — PROGRESS NOTES
Speech Language Pathology  ACUTE REHABILITATION--DAILY PROGRESS NOTE            FIMS SCORES                            Swallowing                          Current Status            5--Supervision               Short Term Goal                   Long Term Goal          6--Modified Independent              Receptive                          Current Status             7--Independent                      Short Term Goal                                   Long Term Goal          Met               Expressive                          Current Status             5--Supervision   Short Term Goal           Long Term Goal          6--Modified Independent     Social Interaction                          Current Status            7--Independent                        Short Term Goal                                   Long Term Goal          Met                                                              Problem Solving                          Current Status             6--Modified Independent / 5--Supervision               Short Term Goal                                   Long Term Goal          6--Modified Independent                          Memory                          Current Status             6--Modified Independent              Short Term Goal                                   Long Term Goal          Met                         SWALLOWING:      Diet:  Dysphagia 3, soft and bite sized consistency solids and regular consistency liquids. Patient tolerating current diet well. He states that he had difficulty with eggs on several different occasions and no longer orders eggs. Difficulty attributed to eggs being dry/small pieces. SLP recommended patient try eggs with melted American cheese (advised against stringy/sticky cheeses like cheddar). Trials of regular consistency solids x2 ordered for tomorrow (reis with breakfast and salad with lunch).      LANGUAGE:     Appropriate and timely responses to higher level questions during conversational tasks. COGNITION:       Good recall of information previously provided by SLP. SPEECH:     Fair/fair+ with unstructured conversation, unknown topic. Right orofacial weakness present. Minimal-moderate distortions in conversational speech. Distortions increase when speaking with an increased rate of speech. Patient independently used compensatory strategies (overexaggerated speech, slow rate of speech, repeating words as necessary). Patient reports completing lingual and labial exercises independently. Good outcome when asked to provide demonstration. Reviewed education on exercises and compensatory strategies. Patient reported he completes his oral motor exercises daily and uses his cell phone camera for visual feedback. Will continue SP intervention as per previously established POC.     Minute Tracking:    Individual therapy:   15 minutes  Concurrent therapy:    0 minutes  Group therapy:     0 minutes  Co-treatment therapy:    0 minutes    Total minutes for 8/20/2019: 15 minutes

## 2019-08-21 PROCEDURE — 6370000000 HC RX 637 (ALT 250 FOR IP): Performed by: INTERNAL MEDICINE

## 2019-08-21 PROCEDURE — 92526 ORAL FUNCTION THERAPY: CPT

## 2019-08-21 PROCEDURE — 6360000002 HC RX W HCPCS: Performed by: INTERNAL MEDICINE

## 2019-08-21 PROCEDURE — 1280000000 HC REHAB R&B

## 2019-08-21 PROCEDURE — 97535 SELF CARE MNGMENT TRAINING: CPT

## 2019-08-21 PROCEDURE — 97110 THERAPEUTIC EXERCISES: CPT

## 2019-08-21 PROCEDURE — 97530 THERAPEUTIC ACTIVITIES: CPT

## 2019-08-21 PROCEDURE — 6370000000 HC RX 637 (ALT 250 FOR IP): Performed by: PHYSICAL MEDICINE & REHABILITATION

## 2019-08-21 PROCEDURE — 92507 TX SP LANG VOICE COMM INDIV: CPT

## 2019-08-21 PROCEDURE — 97112 NEUROMUSCULAR REEDUCATION: CPT

## 2019-08-21 RX ADMIN — ATORVASTATIN CALCIUM 40 MG: 40 TABLET, FILM COATED ORAL at 21:56

## 2019-08-21 RX ADMIN — ASPIRIN 81 MG: 81 TABLET, COATED ORAL at 09:32

## 2019-08-21 RX ADMIN — CLOPIDOGREL 75 MG: 75 TABLET, FILM COATED ORAL at 09:32

## 2019-08-21 RX ADMIN — LISINOPRIL 20 MG: 20 TABLET ORAL at 21:56

## 2019-08-21 RX ADMIN — PANTOPRAZOLE SODIUM 20 MG: 20 TABLET, DELAYED RELEASE ORAL at 06:29

## 2019-08-21 RX ADMIN — LISINOPRIL 20 MG: 20 TABLET ORAL at 09:32

## 2019-08-21 RX ADMIN — METOPROLOL SUCCINATE 50 MG: 50 TABLET, EXTENDED RELEASE ORAL at 09:30

## 2019-08-21 RX ADMIN — ENOXAPARIN SODIUM 40 MG: 40 INJECTION SUBCUTANEOUS at 09:30

## 2019-08-21 ASSESSMENT — PAIN SCALES - GENERAL
PAINLEVEL_OUTOF10: 0

## 2019-08-21 NOTE — PROGRESS NOTES
Progress Note    Subjective/   61y.o. year old male on the rehab unit for stroke. Wife is to come in for teaching and he is waiting for a bathing session. He denies pain complaints. No dizziness. No chills or sweats. No difficulty with bowel or bladder. No chest pain or palpitations. I was called later in the day with report of change in speech pattern as noted per patient and family. Objective/   VITALS:  /74   Pulse 70   Temp 97.8 °F (36.6 °C)   Resp 15   Ht 5' 10\" (1.778 m)   Wt 171 lb 14.4 oz (78 kg)   SpO2 97%   BMI 24.67 kg/m²   24HR INTAKE/OUTPUT:      Intake/Output Summary (Last 24 hours) at 8/20/2019 2333  Last data filed at 8/20/2019 1800  Gross per 24 hour   Intake 1080 ml   Output 600 ml   Net 480 ml     Constitutional:  Alert, awake, no apparent distress   Cardiovascular:  S1, S2 without m/r/g   Respiratory:  CTA B without w/r/r   Abdomen: +BS  Ext: no pitting LE edema, no pain with ROM. Neuro: good sitting balance, no tremor. Dense (R) elvis.  Dysarthria persists    Functional Level      Date   Status   AE    Comments     Feeding   8/12/19   MIn A             Grooming   8/20/19   Mod I       Pt seated to brush teet, comb hair, and wash face/hair      Bathing   8/20/19   SBA   Shower bench, hand held shower    Pt completed full shower      UB Dressing   8/20/19   Mod I       To og pull over shirt      LB Dressing   8/20/19    CGA       To og/doff underwear, pants, socks, shoes with AFO      Homemaking   8/6/19    TBA                  Functional Transfers / Balance:      Date Status DME  Comments   Sit Balance 8/20/19 SBA Shower bench Pt demo'd during ADL    Stand Balance 8/20/19 Min/CGA   demo'd during ADL tasks, and transfer   [x] Tub           [x] Shower   Transfer 8/19/19 8/20/19  Min A            Min A  SBQC, extended tub bench      Shower  bench, SBQC             Pt requiring v/c's for control during descent to bench    Commode   Transfer 8/19/19  Min Assist  Grab bar     Functional   Mobility 8/19/19  CGA-occ Min A SBQC      Other:on/off recliner, couch and standard queen bed  8/19/19  Min A  SBQC      Functional Exercises / Activity:        Sensory / Neuromuscular Re-Education:         Cognitive Skills:    Status Comments   Problem   Solving fair  During ADL   Memory good  \"   Sequencing fair  \"   Safety fair  \"          Scheduled Meds:   pantoprazole  20 mg Oral QAM AC    enoxaparin  40 mg Subcutaneous Daily    aspirin  81 mg Oral Daily    atorvastatin  40 mg Oral Nightly    clopidogrel  75 mg Oral Daily    lisinopril  20 mg Oral BID    metoprolol succinate  50 mg Oral Daily     Continuous Infusions:  PRN Meds:polyvinyl alcohol, acetaminophen, magnesium hydroxide, senna, aluminum & magnesium hydroxide-simethicone  I/O last 3 completed shifts: In: 1080 [P.O.:1080]  Out: 600 [Urine:600]  No intake/output data recorded. Labs reviewed  CBC: No results for input(s): WBC, HGB, PLT in the last 72 hours. BMP:  No results for input(s): NA, K, CL, CO2, BUN, CREATININE, GLUCOSE in the last 72 hours. Hepatic: No results for input(s): AST, ALT, ALB, BILITOT, ALKPHOS in the last 72 hours. BNP: No results for input(s): BNP in the last 72 hours. Lipids: No results for input(s): CHOL, HDL in the last 72 hours. Invalid input(s): LDLCALCU  INR: No results for input(s): INR in the last 72 hours. Assessment/  Patient Active Problem List:     Hepatitis C     Hyperlipidemia     Lumbar spine strain     Hypertension     Arteriosclerotic heart disease (ASHD)     Acute CVA (cerebrovascular accident) ([address]     CAD (coronary artery disease)     S/P coronary artery stent placement     Dysphagia     Ischemic stroke ([address]      Plan/  1. Continue rehab program with focus on improved mobility, balance, safety and mobility  2. Continue secondary stroke prevention  3. Continue dvt prophylaxis  4. Continue medications without change  5.  MRI ordered as per discussion with nursing given complaint regarding worsening speech. Results reviewed and no acute changes noted.           Aaron Vital MD

## 2019-08-21 NOTE — CARE COORDINATION
Pt may be discharged 8/22. Referral made to [facility]) for a narrow base quad cane and extended tub bench. Pt is aware that ETB is private pay.     ROBERT Resendiz Intern  Roger Geneva

## 2019-08-21 NOTE — PROGRESS NOTES
Received phone call from Emma a  with [address] (MMO), per Emma, Mrs. Kehinde Santizo has called O and is very unhappy regarding impending discharge. Reviewed case with Emma. And per her encouragement,  And Mr. Kehinde Santizo have requested a concurrent review be placed tomorrow and then a peer to peer review initiated if patient is denied further days. Emma can be reached at [phone]. Will contact Dr. Thakur Close and appraise him of this situation.

## 2019-08-21 NOTE — PROGRESS NOTES
mulch and when negotiating a curb step. The pt also had a LOB episode when turning to sit after ambulating throughout the session, cited fatigue as the reason for the LOB. The pt's shoes were changed prior and the toe off brace was placed in the new shoes. The pt reported feeling better walking in his new shoes. The pt performed fall recovery in the afternoon and required assistance to complete safely. The pt's wife performed all hands-on guarding of the pt in the afternoon with the exception of a floor to stand transfer during fall recovery. The pt's wife reported she is anxious about the pt returning home. The pt continues to fail to clear the R foot intermittently and his wife provides hands-on guarding to be able to assist him from these LOB episodes. The pt has decreased the number of times he is failing to clear the foot with exercise to strengthen to R anterior tibialis and cues for proper gait sequencing. The pt would benefit from further time in acute rehab as he remains an extremely high fall risk and as a result is a high re-admission risk at this time. Patient/family education  Pt/family educated on increasing alize, decreasing B step length, fall prevention and recovery. Patient/family response to education:   Pt/family verbalized understanding Pt/family demonstrated skill Pt/family requires further education in this area   Yes With cues Yes     AM  Time in: 1045  Time out: 1130    PM  Time in: 1515  Time out: 1600    Pt is making good progress toward established Physical Therapy goals. Continue with physical therapy current plan of care. Broderick Sever.  Trent Uriah, [address] [place] [address]  License Number: [id number]

## 2019-08-21 NOTE — PROGRESS NOTES
Occupational Therapy  OCCUPATIONAL THERAPY DAILY NOTE    Date:2019  Patient Name: Keagan Wilson  MRN: [id number]  : 1955  Room: [address] [place], [place] [address]     Referring Practitioner: Ji Chaudhari   Diagnosis: CVA- L internal capsule & subcortical white matter  Additional Pertinent Hx: Hep C, HTN, HLD, CAD, hx angio 2009 & hx lumbar jah   Precautions: falls, puree    Functional Assessment:   Date Status AE  Comments   Feeding 19 s/u     Grooming 19 Mod I  Pt seated to brush teet, comb hair, and wash face/hair    Bathing 19 SBA Shower bench, hand held shower  Pt completed full shower    UB Dressing 19 Mod I  To og pull over shirt    LB Dressing 19  CGA  To og/doff underwear, pants, socks, shoes with AFO    Homemaking 19  TBA       Functional Transfers / Balance:   Date Status DME  Comments   Sit Balance 19 SBA Shower bench Pt demo'd during ADL    Stand Balance 19 Min/CGA  demo'd during ADL tasks, and transfer   [x] Tub        [x] Shower   Transfer 19  Min A         Min A  SBQC, extended tub bench     Shower  bench, SBQC         Pt requiring v/c's for control during descent to bench    Commode   Transfer 19  Min Assist  [facility]    Functional   Mobility 19  CGA-occ Min A SBQC      Other:on/off recliner, couch and standard queen bed  19  Min A  SBQC     Functional Exercises / Activity:  Pt demo Min A stand pivot trf wc>chair with no arm rests. Pt tolerated InMotion arm RUE using an ace wrap for positioning & 10cm [place]. Pt tolerated re-eval, post test, active assisted protocol & squeegee activity x 4 pictures with Min A. Pt tolerated a total of 484 movement using his RUE. Pt does use compensatory movements from his back & neck to facilitate RUE movement. Pt report benefit of InMotion arm as a modality to facilitate movement RUE with guided assistance. Pt does demonstrate active movement in the lower quadrant in the horizontal plane.     Pt demo

## 2019-08-22 VITALS
HEIGHT: 70 IN | HEART RATE: 72 BPM | OXYGEN SATURATION: 97 % | SYSTOLIC BLOOD PRESSURE: 142 MMHG | DIASTOLIC BLOOD PRESSURE: 68 MMHG | BODY MASS INDEX: 24.61 KG/M2 | WEIGHT: 171.9 LBS | TEMPERATURE: 97.9 F | RESPIRATION RATE: 16 BRPM

## 2019-08-22 LAB
ANION GAP SERPL CALCULATED.3IONS-SCNC: 12 MMOL/L (ref 7–16)
BUN BLDV-MCNC: 14 MG/DL (ref 8–23)
CALCIUM SERPL-MCNC: 9.7 MG/DL (ref 8.6–10.2)
CHLORIDE BLD-SCNC: 106 MMOL/L (ref 98–107)
CO2: 25 MMOL/L (ref 22–29)
CREAT SERPL-MCNC: 0.8 MG/DL (ref 0.7–1.2)
GFR AFRICAN AMERICAN: >60
GFR NON-AFRICAN AMERICAN: >60 ML/MIN/1.73
GLUCOSE BLD-MCNC: 94 MG/DL (ref 74–99)
HCT VFR BLD CALC: 42.1 % (ref 37–54)
HEMOGLOBIN: 14.9 G/DL (ref 12.5–16.5)
MCH RBC QN AUTO: 33.1 PG (ref 26–35)
MCHC RBC AUTO-ENTMCNC: 35.4 % (ref 32–34.5)
MCV RBC AUTO: 93.6 FL (ref 80–99.9)
PDW BLD-RTO: 11.5 FL (ref 11.5–15)
PLATELET # BLD: 153 E9/L (ref 130–450)
PMV BLD AUTO: 12.7 FL (ref 7–12)
POTASSIUM SERPL-SCNC: 3.9 MMOL/L (ref 3.5–5)
RBC # BLD: 4.5 E12/L (ref 3.8–5.8)
SODIUM BLD-SCNC: 143 MMOL/L (ref 132–146)
WBC # BLD: 6.1 E9/L (ref 4.5–11.5)

## 2019-08-22 PROCEDURE — 97110 THERAPEUTIC EXERCISES: CPT | Performed by: OCCUPATIONAL THERAPY ASSISTANT

## 2019-08-22 PROCEDURE — 97110 THERAPEUTIC EXERCISES: CPT

## 2019-08-22 PROCEDURE — 6370000000 HC RX 637 (ALT 250 FOR IP): Performed by: INTERNAL MEDICINE

## 2019-08-22 PROCEDURE — 6370000000 HC RX 637 (ALT 250 FOR IP): Performed by: PHYSICAL MEDICINE & REHABILITATION

## 2019-08-22 PROCEDURE — 97530 THERAPEUTIC ACTIVITIES: CPT

## 2019-08-22 PROCEDURE — 97530 THERAPEUTIC ACTIVITIES: CPT | Performed by: OCCUPATIONAL THERAPY ASSISTANT

## 2019-08-22 PROCEDURE — 80048 BASIC METABOLIC PNL TOTAL CA: CPT

## 2019-08-22 PROCEDURE — 85027 COMPLETE CBC AUTOMATED: CPT

## 2019-08-22 PROCEDURE — 6360000002 HC RX W HCPCS: Performed by: INTERNAL MEDICINE

## 2019-08-22 PROCEDURE — 36415 COLL VENOUS BLD VENIPUNCTURE: CPT

## 2019-08-22 PROCEDURE — 97112 NEUROMUSCULAR REEDUCATION: CPT | Performed by: OCCUPATIONAL THERAPY ASSISTANT

## 2019-08-22 RX ADMIN — PANTOPRAZOLE SODIUM 20 MG: 20 TABLET, DELAYED RELEASE ORAL at 06:17

## 2019-08-22 RX ADMIN — LISINOPRIL 20 MG: 20 TABLET ORAL at 08:41

## 2019-08-22 RX ADMIN — METOPROLOL SUCCINATE 50 MG: 50 TABLET, EXTENDED RELEASE ORAL at 08:41

## 2019-08-22 RX ADMIN — ASPIRIN 81 MG: 81 TABLET, COATED ORAL at 08:41

## 2019-08-22 RX ADMIN — ENOXAPARIN SODIUM 40 MG: 40 INJECTION SUBCUTANEOUS at 08:41

## 2019-08-22 RX ADMIN — CLOPIDOGREL 75 MG: 75 TABLET, FILM COATED ORAL at 08:41

## 2019-08-22 ASSESSMENT — PAIN SCALES - GENERAL: PAINLEVEL_OUTOF10: 0

## 2019-08-22 NOTE — PROGRESS NOTES
end- 6 mins (Ace wrap to assist with grasp of RUE). Sensory / Neuromuscular Re-Education:   Func estim to facilitate wrist flexion/extension of RUE on reciprocal cycle of 10/20. End- 20 mins int- flexion-39 extension- 40. Cognitive Skills:   Status Comments   Problem   Solving fair  During ADL   Memory good  \"   Sequencing fair  \"   Safety fair  \"     Visual Perception:    Education:  Pt educated on R UE positioning and SROM. Pt educated on hand placement during sit to stand transfers     [x] Family teach completed on:8/19/19 Pt's Wife present for family teach session. She participated hands on as pt completed functional transfers and mobility throughout therapy apt setting at [facility]. She was educated on types of DME for bathroom and on elvis dressing techniques. Wife reports being very nervous assisting pt. Encouraged her to attend family teach session on the following day and with have her assist with pt's am self care session. 8/20/19: wife present for ADL this AM participating in shower transfers and dressing tasks; pt/wife educated in proper safety technique with wife demo'd fair understanding of techniques;  Pain Level: No c/o pain during OT     Additional Notes: Issued elastic shoelaces to increase independence. Patient has made good  progress during treatment sessions toward set goals. Therapy emphasis to obtain goals:ADL retraining, transfer training, functional mobility, therex, endurance/balance retraining, neuro reeducation, home management & pt/family education      [] Continue with current OT Plan of care.   [x] Prepare for Discharge     DISCHARGE RECOMMENDATIONS    Long term goals  Time Frame for Long term goals : 4 weeks to address above problem areas  Long term goal 1: Pt demo independent to eat all meals  Long term goal 2: Pt demo Mod I grooming seated & sink level  Long term goal 3: Pt demo SBA to bathe @ shower level both seated & standng  Long term goal 4: Pt demo I UE & Mod I LE dress & demo G- safety & insight  Long term goal 5: Pt demo Mod I commode trf with appropraite DME to ensure pt safety  Long term goals 6: Pt demo SBA walk in shower trf wtih appropraite DME to ensure pt safety  Long term goal 7: Pt demo Min A light homemaking activity  Long term goal 8: Pt demo G endurance for a 30 minute functional activity  Long term goal 9: Pt demo improved functional RUE as evidenced by InMotion arm RUE- On eval RUE with R hand ace wrapped using 10 cm [place]- smoothness 0.267, [place] size 0.003, hold deviation 0.072, displacement 0.043      Recommendations: Recommend continued OT services to address above problem areas    Recommended DME:  SQBC, tub seat, RTS    Post Discharge Care:   []Home Independently  [x]Home with 24hr Care / Supervision []Home with Partial Supervision []Home with Home Health OT []Home with Out Pt OT []Other: ___   Comments:         Time in Time out Tx Time Breakdown  Variance:   First Session  10:00 10:45 [] Individual Tx-   [x] Concurrent Tx -45   [] Co-Tx -   [] Group Tx -   [] Time Missed -     Second Session [phone] [x] Individual Tx- 45 mins  [] Concurrent Tx -  [] Co-Tx -   [] Group Tx -   [] Time Missed -     Third Session    [] Individual Tx-   [] Concurrent Tx -  [] Co-Tx -   [] Group Tx -   [] Time Missed -         Total Tx Time:  90 mins   Tawnya Muse, OTR/L [address] [place]/L [id number]      I have read & agree with the above status.     Georgi Nixon OTR/L [id number]

## 2019-08-22 NOTE — PROGRESS NOTES
Provided discharge instructions to patient at this time. Answered all questions and provided copies of instructions to patient .

## 2019-08-23 ENCOUNTER — TELEPHONE (OUTPATIENT)
Dept: PRIMARY CARE CLINIC | Age: 64
End: 2019-08-23

## 2019-08-23 NOTE — PROGRESS NOTES
Speech Language Pathology  ACUTE REHABILITATION--DISCHARGE SUMMARY            [facility] SCORES                            Swallowing                          Current Status            6--Modified Independent              Short Term Goal                   Long Term Goal          Met          Receptive                          Current Status             7--Independent                      Short Term Goal                                   Long Term Goal          Met               Expressive                          Current Status             5--Supervision   Short Term Goal           Long Term Goal          6--Modified Independent     Social Interaction                          Current Status            7--Independent                        Short Term Goal                                   Long Term Goal          Met                                                              Problem Solving                          Current Status             6--Modified Independent              Short Term Goal                                   Long Term Goal          Met                        Memory                          Current Status             6--Modified Independent              Short Term Goal                                   Long Term Goal          Met                         Speech Pathologist (SLP) completed education with the patient/family regarding type of swallowing impairment. Reviewed current solid/liquid consistency diet recommendations and discussed compensatory strategies to ensure safe PO intake. Reviewed aspiration precautions. Encouraged patient and/or family to engage SLP in unstructured Q&A session relative to identified deficit areas; indicated understanding of all information provided via satisfactory verbal response. Speech Pathologist (SLP) completed education with the patient/family regarding type of speech impairment.  Discussed compensatory strategies to promote increased speech intelligibility,

## 2019-08-27 ENCOUNTER — OFFICE VISIT (OUTPATIENT)
Dept: PRIMARY CARE CLINIC | Age: 64
End: 2019-08-27
Payer: COMMERCIAL

## 2019-08-27 VITALS
HEART RATE: 67 BPM | TEMPERATURE: 97.6 F | OXYGEN SATURATION: 98 % | DIASTOLIC BLOOD PRESSURE: 64 MMHG | BODY MASS INDEX: 23.96 KG/M2 | WEIGHT: 167 LBS | SYSTOLIC BLOOD PRESSURE: 128 MMHG

## 2019-08-27 DIAGNOSIS — I63.9 ISCHEMIC STROKE (HCC): Primary | ICD-10-CM

## 2019-08-27 DIAGNOSIS — I63.9 ACUTE CVA (CEREBROVASCULAR ACCIDENT) (HCC): ICD-10-CM

## 2019-08-27 PROCEDURE — 99496 TRANSJ CARE MGMT HIGH F2F 7D: CPT | Performed by: FAMILY MEDICINE

## 2019-08-27 PROCEDURE — 1111F DSCHRG MED/CURRENT MED MERGE: CPT | Performed by: FAMILY MEDICINE

## 2019-08-27 RX ORDER — CLOPIDOGREL BISULFATE 75 MG/1
75 TABLET ORAL DAILY
Qty: 90 TABLET | Refills: 1 | Status: SHIPPED | OUTPATIENT
Start: 2019-08-27 | End: 2019-11-18

## 2019-08-27 RX ORDER — METOPROLOL SUCCINATE 50 MG/1
50 TABLET, EXTENDED RELEASE ORAL DAILY
Qty: 90 TABLET | Refills: 1 | Status: SHIPPED | OUTPATIENT
Start: 2019-08-27 | End: 2020-01-13

## 2019-08-27 NOTE — PROGRESS NOTES
mouth every morning (before breakfast)             polyvinyl alcohol (LIQUIFILM TEARS) 1.4 % ophthalmic solution  Place 2 drops into both eyes as needed for Dry Eyes (irritation)             senna (SENOKOT) 8.6 MG tablet  Take 1 tablet by mouth daily as needed (Constipation)             sertraline (ZOLOFT) 50 MG tablet  Take 1 tablet by mouth daily                   Medications marked \"taking\" at this time  Outpatient Medications Marked as Taking for the 8/27/19 encounter (Office Visit) with Chapincito Marcus, DO   Medication Sig Dispense Refill    metoprolol succinate (TOPROL XL) 50 MG extended release tablet Take 1 tablet by mouth daily 90 tablet 1    clopidogrel (PLAVIX) 75 MG tablet Take 1 tablet by mouth daily 90 tablet 1    sertraline (ZOLOFT) 50 MG tablet Take 1 tablet by mouth daily 30 tablet 2        Medications patient taking as of now reconciled against medications ordered at time of hospital discharge: Yes    Chief Complaint   Patient presents with   [address] Management     transitional care management       History of Present illness - Follow up of Hospital diagnosis(es): acute  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 Inpatient course: Discharge summary reviewed- see chart. Interval history/Current status: to start outpatient PT. Was in acute rehab for 2 wks. On Lipitor now. A comprehensive review of systems was negative except for: Ears, nose, mouth, throat, and face: positive for slurred speech improving but worse as he tires  Musculoskeletal: positive for muscle weakness  Neurological: positive for weakness and in right arm and leg  Behavioral/Psych: positive for easily agitated. Vitals:    08/27/19 1123   BP: 128/64   Pulse: 67   Temp: 97.6 °F (36.4 °C)   SpO2: 98%   Weight: 167 lb (75.8 kg)     Body mass index is 23.96 kg/m².    Wt Readings from Last 3 Encounters:   08/27/19 167 lb (75.8 kg)   08/05/19 171 lb 14.4 oz (78 kg)   07/31/19 180 lb (81.6 kg)     BP Readings from

## 2019-08-28 ENCOUNTER — HOSPITAL ENCOUNTER (OUTPATIENT)
Dept: PHYSICAL THERAPY | Age: 64
Setting detail: THERAPIES SERIES
Discharge: HOME | End: 2019-08-28
Payer: COMMERCIAL

## 2019-08-28 PROCEDURE — 97162 PT EVAL MOD COMPLEX 30 MIN: CPT

## 2019-08-28 NOTE — PROGRESS NOTES
[facility]                Phone: [phone]   Fax: [phone]    Physical Therapy Daily Treatment Note  Date:  2019    Patient Name:  Desi Escalera    :  1955  MRN: [id number]    Referring Physician:  Dr. Stephon Stephens Information:  [facility] [place]      Evaluation date:  2019  Diagnosis:  CVA  Treatment diagnosis:  R LE weakness, gait impairment  Evaluating Physical Therapist:  Leonard Ramirez PT, DPT      Visit# / total visits:  -   Time In:    Time Out:      Subjective:      Exercises:   Exercise/Equipment Resistance/Repetitions Other comments      Bike   High resistance to improve coordination              Standing hip 3-way        Standing hamstring curls               Total gym  Focus on motor control of R knee (high reps)              Sidestepping                 Step-ups  Forward  Lateral  Focus on motor control of R knee                 Cone taps                Ankle strengthening DF/PF  INV/EV               Gait training                Stair training                         Assessment/Comments:      Home Exercise Program:        Treatment/Activity Tolerance:  [] Patient tolerated treatment well [] Patient limited by fatigue  [] Patient limited by pain  [] Patient limited by other medical complications  [] Other:     Prognosis: [x] Good [] Fair  [] Poor    Patient Requires Follow-up: [x] Yes  [] No    Plan:   [] Continue per plan of care [] Alter current plan (see comments)  [x] Plan of care initiated [] Hold pending MD visit [] Discharge    Plan for Next Session:  Begin R LE strengthening and coordination exercises as well as gait and stair training.      See Progress Note: []  Yes  [x]  No         Electronically signed by:  Leonard Ramirez PT, DPT

## 2019-08-28 NOTE — PROGRESS NOTES
[address] [facility]                Phone: [phone]   Fax: [phone]    Physical Therapy Initial Evaluation  Date:  2019    Patient Name:  Delores Morrison    :  1955  MRN: [id number]    Referring Physician:  Dr. Kehinde Khoury Information:  [facility] [place]     Evaluation date:  2019  Diagnosis:  CVA  Treatment diagnosis:  R LE weakness, gait impairment  Evaluating Physical Therapist:  Mookie Mccollum PT, DPT      Subjective:    Pain:  0/10     Edema:  none noted    Sensation:  denies numbness/tingling to all extremities    History/Onset of Symptoms:  Pt went to ER on 2019 for R-sided weakness. Pt was in ARU from 2019 to 2019. Pt was discharged home with his wife and denies any falls since going home. Pt stated he is walking a lot at home and has been going up/down the steps at home. Pt's wife assists PRN. Pt wants to drive again and return to work as soon as possible. Pt works as a  and sits at [employer] [facility] [employer] for a majority of his day. Pt has been using SBQC and R toe-off brace since being discharged from ARU. Additional Comments:  Pt lives alone in a 2 floor plan with 4-5 steps and 1 rail to enter with 7+7 steps and 1+0 rail to 2nd floor bedroom, 1/2 bath on 1st floor. Prior to admission pt ambulated with no AD and was Independent. Pt has a second home in [place], which is where his wife was living prior to his CVA. Pt's wife is in [facility] now indefinitely. Objective:    AROM Deficits:  L LE AROM WFL. R hip and knee AROM WFL, R ankle AA/PROM [place]. Strength Deficits:  L hip 4+/5, knee and ankle 5/5. R hip 3/5, knee flexion 3+/5, knee extension 4/5, ankle DF 1/5 (inconsistent), PF 2-/5, INV/EV 0/5. Coordination:  Heel-shin testing WNL on L LE, moderate impairment on R LE; also noted moderate impairment on R LE with functional activities (i.e., steps).      Gait:  Pt ambulated 100' with SBQC and R toe-off brace

## 2019-08-30 ENCOUNTER — HOSPITAL ENCOUNTER (OUTPATIENT)
Dept: PHYSICAL THERAPY | Age: 64
Setting detail: THERAPIES SERIES
Discharge: HOME | End: 2019-08-30
Payer: COMMERCIAL

## 2019-08-30 ENCOUNTER — OFFICE VISIT (OUTPATIENT)
Dept: NEUROLOGY | Age: 64
End: 2019-08-30
Payer: COMMERCIAL

## 2019-08-30 VITALS
HEIGHT: 69 IN | DIASTOLIC BLOOD PRESSURE: 80 MMHG | HEART RATE: 60 BPM | BODY MASS INDEX: 24.73 KG/M2 | WEIGHT: 167 LBS | SYSTOLIC BLOOD PRESSURE: 130 MMHG

## 2019-08-30 DIAGNOSIS — I63.312 CEREBRAL INFARCTION DUE TO THROMBOSIS OF LEFT MIDDLE CEREBRAL ARTERY (HCC): Primary | ICD-10-CM

## 2019-08-30 DIAGNOSIS — G81.91 RIGHT HEMIPARESIS (HCC): ICD-10-CM

## 2019-08-30 DIAGNOSIS — I69.322 DYSARTHRIA AS LATE EFFECT OF STROKE: ICD-10-CM

## 2019-08-30 PROCEDURE — 99204 OFFICE O/P NEW MOD 45 MIN: CPT | Performed by: PSYCHIATRY & NEUROLOGY

## 2019-08-30 PROCEDURE — G8598 ASA/ANTIPLAT THER USED: HCPCS | Performed by: PSYCHIATRY & NEUROLOGY

## 2019-08-30 PROCEDURE — 97110 THERAPEUTIC EXERCISES: CPT

## 2019-08-30 PROCEDURE — G8420 CALC BMI NORM PARAMETERS: HCPCS | Performed by: PSYCHIATRY & NEUROLOGY

## 2019-08-30 PROCEDURE — 1036F TOBACCO NON-USER: CPT | Performed by: PSYCHIATRY & NEUROLOGY

## 2019-08-30 PROCEDURE — 3017F COLORECTAL CA SCREEN DOC REV: CPT | Performed by: PSYCHIATRY & NEUROLOGY

## 2019-08-30 PROCEDURE — G8427 DOCREV CUR MEDS BY ELIG CLIN: HCPCS | Performed by: PSYCHIATRY & NEUROLOGY

## 2019-08-30 PROCEDURE — 1111F DSCHRG MED/CURRENT MED MERGE: CPT | Performed by: PSYCHIATRY & NEUROLOGY

## 2019-08-30 ASSESSMENT — ENCOUNTER SYMPTOMS
EYES NEGATIVE: 1
GASTROINTESTINAL NEGATIVE: 1
ALLERGIC/IMMUNOLOGIC NEGATIVE: 1

## 2019-08-30 NOTE — PROGRESS NOTES
of sequelae small vessel ischemic disease. 3. Empty sella. 7/31/19. Carotid U/S:   Focal calcified plaque formation causing mild to moderate anatomic   steno, no higher than 50% is seen in the right bulbar region. No hemodynamic stenosis in the right or left carotid arterial systems. Antegrade VA flow bilat. 7/31/, 2-D Echo/Bubble study:   No intracardiac mass.   Agitated saline injection showed no right to left intracardiac shunt.   Left ventricular size is normal.   Normal LV segmental wall motion, EF 60%.   There is doppler evidence of stage I diastolic dysfunction.   Left atrium is of normal size.   Interatrial septum not well visualized but appears intact.   Normal right ventricle structure and function.   Normal mitral valve structure and function.   No mitral valve prolapse.   Normal aortic valve structure and function.   Normal aortic root size.   Trace of pericardial effusion.   Pericardium appears normal.   No recent study to compare.     Current Outpatient Medications   Medication Sig Dispense Refill    metoprolol succinate (TOPROL XL) 50 MG extended release tablet Take 1 tablet by mouth daily 90 tablet 1    clopidogrel (PLAVIX) 75 MG tablet Take 1 tablet by mouth daily 90 tablet 1    sertraline (ZOLOFT) 50 MG tablet Take 1 tablet by mouth daily 30 tablet 2    aspirin 81 MG EC tablet Take 1 tablet by mouth daily 30 tablet 3    aluminum & magnesium hydroxide-simethicone (MAALOX) 200-200-20 MG/5ML SUSP suspension Take 30 mLs by mouth 4 times daily as needed for Indigestion 1 Bottle 0    atorvastatin (LIPITOR) 40 MG tablet Take 1 tablet by mouth nightly 30 tablet 3    lisinopril (PRINIVIL;ZESTRIL) 20 MG tablet Take 1 tablet by mouth 2 times daily 30 tablet 3    senna (SENOKOT) 8.6 MG tablet Take 1 tablet by mouth daily as needed (Constipation)      polyvinyl alcohol (LIQUIFILM TEARS) 1.4 % ophthalmic solution Place 2 drops into both eyes as needed for Dry Eyes (irritation) 1 Bottle well-nourished, well-groomed, seated in a chair in the exam room next to his wife, no acute distress  HEENT: Normocephalic/atraumatic. Neck: Supple, no carotid bruits  Cardiac: RRR  Respiratory: grossly clear  Extremities: No edema, erythema  Skin: No lesions or rashes  Musculoskeletal: Fasciculations or tremors  Mental Status: Alert, oriented x3  Speech/Language: Moderately dysarthric, no paraphasic word errors  Attention span/Concentration: Grossly intact  Affect/Mood: Mildly anxious  Insight/Judgement: Fairly good     Fund of Knowledge/Current events: Grossly intact  CN II-XII:     Pupils: Equal, reactive to light, 1.4 mm     EOM's: Full without nystagmus  Visual Fields: Full to confrontation  Fundi: miosis from light, unable to well visualize  CN V: normal V1-V3  CN VII: Right central facial paresis with decreased facial motor tone and decreased nasolabial fold, asymmetric smile  CN VIII: Hearing grossly intact  CN IX-XII: No palatal asymmetry, tongue midline  SCM/Trapezii: 5/5 power  Motor: Right hemiparesis of moderate degree, 4/5 proximally in the right upper extremity and 0/5 strength of right hand , finger flexion/extension, abduction/adduction. No fine motor control of the fingers of the right hand. Decreased motor tone of the right upper extremity. 4+/5 weakness of the right lower extremity. 5/5 power of the left extremities. DTR's: 1+ in the right upper extremity, 1-2+ in the left upper extremity, 1+ patellar, trace to 1+ ankle jerks, no ankle clonus. Plantar responses are equivocal to flexor  Sensory: Grossly intact subjectively to light touch and sharp stick testing. No right/left confusion. Does not extinguish to double simultaneous stimulation  Coordination/Gait: Left finger-to-nose testing is grossly intact without dysmetria. No truncal or cerebellar gait ataxia. Favors his right leg on ambulation due to residual motor weakness. Assessment/Plan:  1.   Status post left subcortical MCA

## 2019-09-04 ENCOUNTER — HOSPITAL ENCOUNTER (OUTPATIENT)
Dept: PHYSICAL THERAPY | Age: 64
Setting detail: THERAPIES SERIES
Discharge: HOME | End: 2019-09-04
Payer: COMMERCIAL

## 2019-09-04 ENCOUNTER — HOSPITAL ENCOUNTER (OUTPATIENT)
Dept: OCCUPATIONAL THERAPY | Age: 64
Setting detail: THERAPIES SERIES
Discharge: HOME | End: 2019-09-04
Payer: COMMERCIAL

## 2019-09-04 PROCEDURE — 97530 THERAPEUTIC ACTIVITIES: CPT | Performed by: PHYSICAL THERAPIST

## 2019-09-04 PROCEDURE — 97165 OT EVAL LOW COMPLEX 30 MIN: CPT | Performed by: OCCUPATIONAL THERAPIST

## 2019-09-04 PROCEDURE — 97032 APPL MODALITY 1+ESTIM EA 15: CPT | Performed by: OCCUPATIONAL THERAPIST

## 2019-09-04 PROCEDURE — 97110 THERAPEUTIC EXERCISES: CPT | Performed by: PHYSICAL THERAPIST

## 2019-09-04 PROCEDURE — 97112 NEUROMUSCULAR REEDUCATION: CPT | Performed by: PHYSICAL THERAPIST

## 2019-09-04 PROCEDURE — 97140 MANUAL THERAPY 1/> REGIONS: CPT | Performed by: OCCUPATIONAL THERAPIST

## 2019-09-04 NOTE — PROGRESS NOTES
OCCUPATIONAL THERAPY INITIAL EVALUATION       Date:  2019  Initial Evaluation Date: 19    Patient Name:  Young Urrutia    :  1955    Restrictions/Precautions:  none, low fall risk  Diagnosis:  CVA R       Insurance/Certification information:  [employer]  Referring Practitioner:  Dr. Alisha Cain  Date of Surgery/Injury: 19  Plan of care signed (Y/N):  no  Visit# / total visits:     Past Medical History:   Past Medical History:   Diagnosis Date    Arteriosclerotic heart disease (ASHD) 2013    CVA (cerebral vascular accident) ([facility] [address]     L MCA     Diverticula of colon     Hepatitis C     Hyperlipidemia     Hypertension 2013    Ischemic heart disease     Lumbar spine strain 2015     Past Surgical History:   Past Surgical History:   Procedure Laterality Date    CORONARY ANGIOPLASTY WITH STENT PLACEMENT      1 in Feb & 2 in Aug       Reason for Referral: Pt. Reports he awoke with spinning sensations, con'maryanne to get worse and pt. began slurring his speech. Pt. Then noticed he was unable to move his arm and little leg movement. Pt. Was hospitalized for one month. Home Living: Lives with wife  Prior Level of Function: independent    Pain Level: 3 on scale of 1-10, aching in the shoulder area    Cognition:   Alert/Oriented x3     IADL History  Homemaking Responsibility: mod assist. Pt. Is independent with light homemaking only  Shopping Responsibility: not able  Mode of Transportation: unable to drive  Leisure & Hobbies: limited  Work: managerial work. ADL  Feeding: Mod ind.   Grooming: Mod ind  Bathing: Mod ind  UE Dressing: Mod ind  LE Dressing: Mod ind  Toileting: Mod ind  Transfer:  Independent with a cane    UE Assessment:  Active shoulder flex/abd to approx 90 degrees with elbow bent. Active elbow ext/flex: -40/100   No active wrist and min finger motion noted. Pt wears arm in a sling most of the day.      Comment: Hand Dominance is

## 2019-09-06 ENCOUNTER — HOSPITAL ENCOUNTER (OUTPATIENT)
Dept: PHYSICAL THERAPY | Age: 64
Setting detail: THERAPIES SERIES
Discharge: HOME | End: 2019-09-06
Payer: COMMERCIAL

## 2019-09-06 PROCEDURE — 97530 THERAPEUTIC ACTIVITIES: CPT | Performed by: PHYSICAL THERAPIST

## 2019-09-06 PROCEDURE — 97110 THERAPEUTIC EXERCISES: CPT | Performed by: PHYSICAL THERAPIST

## 2019-09-06 PROCEDURE — 97112 NEUROMUSCULAR REEDUCATION: CPT | Performed by: PHYSICAL THERAPIST

## 2019-09-09 ENCOUNTER — HOSPITAL ENCOUNTER (OUTPATIENT)
Dept: OCCUPATIONAL THERAPY | Age: 64
Setting detail: THERAPIES SERIES
Discharge: HOME | End: 2019-09-09
Payer: COMMERCIAL

## 2019-09-09 PROCEDURE — 97140 MANUAL THERAPY 1/> REGIONS: CPT | Performed by: OCCUPATIONAL THERAPIST

## 2019-09-09 PROCEDURE — 97112 NEUROMUSCULAR REEDUCATION: CPT | Performed by: OCCUPATIONAL THERAPIST

## 2019-09-09 PROCEDURE — 97032 APPL MODALITY 1+ESTIM EA 15: CPT | Performed by: OCCUPATIONAL THERAPIST

## 2019-09-09 PROCEDURE — 97530 THERAPEUTIC ACTIVITIES: CPT | Performed by: OCCUPATIONAL THERAPIST

## 2019-09-09 NOTE — PROGRESS NOTES
OCCUPATIONAL THERAPY PROGRESS NOTE    Date:  2019   Initial Evaluation Date: 19     Patient Name:  C[place] Ibanez                     :  1955     Restrictions/Precautions:  none, low fall risk  Diagnosis:  CVA R                                                      Insurance/Certification information:  Medical [employer]  Referring Practitioner:  Dr. Ledy Mercedes  Date of Surgery/Injury: 19  Plan of care signed (Y/N):  no  Visit# / total visits:   Pain Level: mild, aching    Subjective: \"fingers are moving a bit since the last session. \"     Objective:  Updated POC to be completed by 10/4. INTERVENTION: COMPLETED: SPECIFICS/COMMENTS:   Modality:               AROM:     supine shoulder flex'/elbow ext/flex with dowel wrapped. x 15 min   UBE x 10 min   AAROM:     Shoulder, elbow and hand arom all planes x 15 min        PROM/Stretching:     Shoulder elbow and hand prom all planes x 10 min        Scar Mass/Edema Control:               Strengthening:               Other:     Fes to finger flexors, extensors x 10 min          Assessment/Comments: Pt is making Good progress toward stated plan of care. Approx 1/4 to 1/2 range of active finger flexion. Unable to open hand. Good response with fes. Progressing.     -Rehab Potential: Good    -Requires OT Follow Up: Yes  Time In:10           Time Out: 11               Treatment Charges: Mins Units   Modalities. estim 15 1   Ther Exercise 15 1   Manual Therapy 30 2   Thera Activities     ADL/Home Mgt      Neuro Re-education     Gait Training     Group Therapy     Non-Billable Service Time     Other     Total Time/Units 60 4     Goals: Goals for pt can be see on initial eval occurring on 19    Plan:   [x]  Continues Plan of care: Treatment covered based on POC and graduated to patient's progress. Pt education continues at each visit to obtain maximum benefits from skilled OT intervention.   []  Alter Plan of care:   []  Discharge:      Radha Mayfield OT/L,

## 2019-09-11 ENCOUNTER — HOSPITAL ENCOUNTER (OUTPATIENT)
Dept: PHYSICAL THERAPY | Age: 64
Setting detail: THERAPIES SERIES
Discharge: HOME | End: 2019-09-11
Payer: COMMERCIAL

## 2019-09-11 ENCOUNTER — HOSPITAL ENCOUNTER (OUTPATIENT)
Dept: OCCUPATIONAL THERAPY | Age: 64
Setting detail: THERAPIES SERIES
Discharge: HOME | End: 2019-09-11
Payer: COMMERCIAL

## 2019-09-11 PROCEDURE — 97110 THERAPEUTIC EXERCISES: CPT | Performed by: PHYSICAL THERAPIST

## 2019-09-11 PROCEDURE — 97032 APPL MODALITY 1+ESTIM EA 15: CPT | Performed by: OCCUPATIONAL THERAPIST

## 2019-09-11 PROCEDURE — 97530 THERAPEUTIC ACTIVITIES: CPT | Performed by: PHYSICAL THERAPIST

## 2019-09-11 PROCEDURE — 97112 NEUROMUSCULAR REEDUCATION: CPT | Performed by: PHYSICAL THERAPIST

## 2019-09-11 PROCEDURE — 97140 MANUAL THERAPY 1/> REGIONS: CPT | Performed by: OCCUPATIONAL THERAPIST

## 2019-09-11 PROCEDURE — 97112 NEUROMUSCULAR REEDUCATION: CPT | Performed by: OCCUPATIONAL THERAPIST

## 2019-09-11 NOTE — PROGRESS NOTES
Poor    Patient Requires Follow-up: [x] Yes  [] No    Plan:   [x] Continue per plan of care [] Alter current plan (see comments)  [] Plan of care initiated [] Hold pending MD visit [] Discharge  Plan for Next Session:        Electronically signed by:  Ijeoma Franco, PT [id number]

## 2019-09-13 ENCOUNTER — HOSPITAL ENCOUNTER (OUTPATIENT)
Dept: PHYSICAL THERAPY | Age: 64
Setting detail: THERAPIES SERIES
Discharge: HOME | End: 2019-09-13
Payer: COMMERCIAL

## 2019-09-13 PROCEDURE — 97110 THERAPEUTIC EXERCISES: CPT

## 2019-09-13 NOTE — PROGRESS NOTES
[address]      Phone: [phone]  Fax: [phone]    Physical Therapy Daily Treatment Note  Date:  2019    Patient Name:  Gill Cotto    :  1955  MRN: [id number]    Restrictions/Precautions:    Diagnosis:  CVA  Treatment Diagnosis:    Insurance/Certification information:  [employer]  Referring Physician:  Holly Ramirez  Plan of care signed (Y/N):    Visit# / total visits:  -  Pain level: 8/10 R shoulder , 10/10 L  LBP  Time In:  901  Time Out:  950    Subjective:  Pt reports his feet got tangled up yesterday and he had LOB into the corner of a wall, jarring R shoulder. Wife was present. Therapist did not note any edema or bruising of R shoulder upon inspection. Exercises:  Exercise/Equipment Resistance/Repetitions Other comments     Recumbent bike 10 minutes      SLR 2 x 10 reps R LE      bridging Single leg R LE x 10 reps  Double leg 2 x 10 reps      Side lying abd and ext 2 x 10 reps R LE AAROM      ankle Facilitated AAROM DF/eversion x 20 reps      SAQ 0# x 10 reps  2# x 10 reps Emphasis on coordinated mvmt     Sit to stand Facilitated 2 x 10 reps  w/ L LE on 8\" step x 15 reps      Stairs/step-ups 6\" step, facilitated, leading with R LE 2 x 10 reps Emphasis on hip/knee flex and ankle DF when lifting R LE onto/off of step and preventing R knee hyperext with step-up   Standing marchesLifting L LE only, x 10-15 repsslow pace to emphasis wt shift and weight bearing on R LE                                                                    Other Therapeutic Activities:  Gait with SBQC, with and without AFO, emphasis on controlled swing-through and step-through gait pattern. 19 : gait in II bars x 1 lap no AD and lite touch for balance close SBA of therapist.   This was performed during session as pt reported to therapist that he has been attempting this at home in his kitchen w/ his Wife present.  Without AD gait is moderately unsteady . Therapist reinforced w/ pt that , he is currently not safe to perform this task at home .      Home Exercise Program:  N/A    Manual Treatments:  Passive stretch R LE x 5 minutes    Modalities:  N/A    Timed Code Treatment Minutes:  49    Total Treatment Minutes:  49    Treatment/Activity Tolerance:  [x] Patient tolerated treatment well [] Patient limited by fatigue  [] Patient limited by pain  [] Patient limited by other medical complications  [] Other:     Prognosis: [x] Good [] Fair  [] Poor    Patient Requires Follow-up: [x] Yes  [] No    Plan:   [x] Continue per plan of care [] Alter current plan (see comments)  [] Plan of care initiated [] Hold pending MD visit [] Discharge  Plan for Next Session:        Electronically signed by:  Avis Castellano, PTA [id number]

## 2019-09-16 ENCOUNTER — HOSPITAL ENCOUNTER (OUTPATIENT)
Dept: OCCUPATIONAL THERAPY | Age: 64
Setting detail: THERAPIES SERIES
End: 2019-09-16
Payer: COMMERCIAL

## 2019-09-18 ENCOUNTER — APPOINTMENT (OUTPATIENT)
Dept: OCCUPATIONAL THERAPY | Age: 64
End: 2019-09-18
Payer: COMMERCIAL

## 2019-09-23 ENCOUNTER — HOSPITAL ENCOUNTER (OUTPATIENT)
Dept: OCCUPATIONAL THERAPY | Age: 64
Setting detail: THERAPIES SERIES
Discharge: HOME | End: 2019-09-23
Payer: COMMERCIAL

## 2019-09-23 PROCEDURE — 97112 NEUROMUSCULAR REEDUCATION: CPT | Performed by: OCCUPATIONAL THERAPIST

## 2019-09-23 PROCEDURE — 97110 THERAPEUTIC EXERCISES: CPT | Performed by: OCCUPATIONAL THERAPIST

## 2019-09-23 PROCEDURE — 97140 MANUAL THERAPY 1/> REGIONS: CPT | Performed by: OCCUPATIONAL THERAPIST

## 2019-09-25 ENCOUNTER — HOSPITAL ENCOUNTER (OUTPATIENT)
Dept: OCCUPATIONAL THERAPY | Age: 64
Setting detail: THERAPIES SERIES
Discharge: HOME | End: 2019-09-25
Payer: COMMERCIAL

## 2019-09-25 ENCOUNTER — HOSPITAL ENCOUNTER (OUTPATIENT)
Dept: PHYSICAL THERAPY | Age: 64
Setting detail: THERAPIES SERIES
Discharge: HOME | End: 2019-09-25
Payer: COMMERCIAL

## 2019-09-25 PROCEDURE — 97140 MANUAL THERAPY 1/> REGIONS: CPT | Performed by: OCCUPATIONAL THERAPIST

## 2019-09-25 PROCEDURE — 97112 NEUROMUSCULAR REEDUCATION: CPT | Performed by: OCCUPATIONAL THERAPIST

## 2019-09-25 PROCEDURE — 97530 THERAPEUTIC ACTIVITIES: CPT | Performed by: PHYSICAL THERAPIST

## 2019-09-25 PROCEDURE — 97112 NEUROMUSCULAR REEDUCATION: CPT | Performed by: PHYSICAL THERAPIST

## 2019-09-25 PROCEDURE — 97110 THERAPEUTIC EXERCISES: CPT | Performed by: OCCUPATIONAL THERAPIST

## 2019-09-25 PROCEDURE — 97110 THERAPEUTIC EXERCISES: CPT | Performed by: PHYSICAL THERAPIST

## 2019-09-25 PROCEDURE — 97530 THERAPEUTIC ACTIVITIES: CPT | Performed by: OCCUPATIONAL THERAPIST

## 2019-09-27 ENCOUNTER — HOSPITAL ENCOUNTER (OUTPATIENT)
Dept: PHYSICAL THERAPY | Age: 64
Setting detail: THERAPIES SERIES
Discharge: HOME | End: 2019-09-27
Payer: COMMERCIAL

## 2019-09-27 PROCEDURE — 97530 THERAPEUTIC ACTIVITIES: CPT | Performed by: PHYSICAL THERAPIST

## 2019-09-27 PROCEDURE — 97110 THERAPEUTIC EXERCISES: CPT | Performed by: PHYSICAL THERAPIST

## 2019-09-27 PROCEDURE — 97112 NEUROMUSCULAR REEDUCATION: CPT | Performed by: PHYSICAL THERAPIST

## 2019-09-30 ENCOUNTER — HOSPITAL ENCOUNTER (OUTPATIENT)
Dept: OCCUPATIONAL THERAPY | Age: 64
Setting detail: THERAPIES SERIES
Discharge: HOME | End: 2019-09-30
Payer: COMMERCIAL

## 2019-10-01 NOTE — DISCHARGE SUMMARY
generate solutions;Assistance required to correct errors made  Sensation  Overall Sensation Status: Impaired  Light Touch: Partial deficits in the RUE     LUE AROM : WFL  Left Hand AROM: WFL  Left Hand AROM: Pt has a full grasp & release & is able to oppose his thumb to each digit  RUE PROM: WFL  RUE AROM: Slight shoulder shrug in gravity eliminated plane  Right Hand PROM: WFL     LUE Strength  LUE Strength: WFL  L Hand General: 5/5  RUE Strength  R Hand : 0/5  RUE Strength Comment: Shoulder 1+/5, elbow, wrist & fingers 0/5  Hand Dominance: Left     Bed Mobility  Rolling: SBA  Supine to sit: SBA  Sit to supine: NT  Scooting: SBA toward EOB Rolling: NT  Supine to sit: Mod A  Sit to supine: Mod A  Scooting: Min A toward EOB Rolling: Independent   Supine to sit: Independent   Sit to supine: Independent   Scooting: Independent    Transfers Sit to stand: SBA  Stand to sit: SBA  Stand pivot: Min A without AD Sit to stand: Mod A  Stand to sit: Mod A  Stand pivot: Mod A x2 with SBQC       Min A with [employer]   Ambulation   75 feet x2 without AD with Min A 15 feet x2 with SBQC with Mod A x2   100 feet with [employer] with Min A   Stair negotiation: 4 steps with 2 rail(s) with Min A NT   4 steps with 1 rail with Mod A   ROM B UE: Refer to OT note  B LE: WFL NT     Strength B UE: Refer to OT note  B LE: L 5/5, R 4+/5 NT     Balance Sitting EOB: SBA  Dynamic standing: Min A without AD Sitting EOB: Min A  Dynamic standing: Mod A x2 with SBQC Sitting EOB: Independent   Dynamic standing: Independent              Discharge Functional Status:      Initial Evaluation  8/6/19 Discharge  8/22/19    Short Term Goals Long Term Goals    Bed Mobility  Rolling: Min A  Supine to sit: Min A  Sit to supine: Min A  Scooting: Min A Modified Independent Supervision Modified Independent   Transfers Sit to stand: Min A  Stand to sit: Min A  Stand pivot:  Mod A Sit<>stand: SBA  Stand pivot: SBA with SBQC SBA Modified Independent   Ambulation   25 feet with Status            7--Independent                        Short Term QZTH                                   Long Term URJR          LUX                           Problem Solving                          UPQMQVM Status             6--Modified Independent              Short Term RBMM                                   Long Term Goal          Met                        Memory                          JTQSJTC Status             6--Modified Independent              Short Term Goal                                   Long Term Goal          Met                                                                                                                                                                                                 Indication for Admission: Limited mobility, independence with self care and communication secondary to stroke. Rehabilitation Course: He did participate in a comprehensive inpatient rehab program and made gains as outlined above. He had an episode of a change in his speech pattern but workup showed no acute neuro changes on imaging. Reviewed results with patient and his family and that the findings on imaging were consistent with expected evolution of the initial stroke. He was maintained on dvt prophylaxis. He was maintained on secondary stroke prevention. He did get appropriate support for the (L) UE to reduce the risk of shoulder subluxation. He did have some complaints about sleep but melatonin was not tolerated. Consults: none    Significant Diagnostic Studies: labs:   Results for Mavis Wyatt (MRN [id number]) as of 10/1/2019 00:11   Ref.  Range 8/6/2019 06:25 8/6/2019 12:47 8/20/2019 13:24 8/22/2019 05:13   Sodium Latest Ref Range: 132 - 146 mmol/L 142   143   Potassium Latest Ref Range: 3.5 - 5.0 mmol/L 3.9   3.9   Chloride Latest Ref Range: 98 - 107 mmol/L 105   106   CO2 Latest Ref Range: 22 - 29 mmol/L 24   25   BUN Latest Ref Range: 8 - 23 mg/dL 41 (H)   14   Creatinine Latest

## 2019-10-02 ENCOUNTER — HOSPITAL ENCOUNTER (OUTPATIENT)
Dept: OCCUPATIONAL THERAPY | Age: 64
Setting detail: THERAPIES SERIES
Discharge: HOME | End: 2019-10-02
Payer: COMMERCIAL

## 2019-10-02 ENCOUNTER — HOSPITAL ENCOUNTER (OUTPATIENT)
Dept: PHYSICAL THERAPY | Age: 64
Setting detail: THERAPIES SERIES
Discharge: HOME | End: 2019-10-02
Payer: COMMERCIAL

## 2019-10-02 PROCEDURE — 97110 THERAPEUTIC EXERCISES: CPT | Performed by: PHYSICAL THERAPIST

## 2019-10-02 PROCEDURE — 97032 APPL MODALITY 1+ESTIM EA 15: CPT | Performed by: OCCUPATIONAL THERAPIST

## 2019-10-02 PROCEDURE — 97110 THERAPEUTIC EXERCISES: CPT | Performed by: OCCUPATIONAL THERAPIST

## 2019-10-02 PROCEDURE — 97530 THERAPEUTIC ACTIVITIES: CPT | Performed by: PHYSICAL THERAPIST

## 2019-10-02 PROCEDURE — 97140 MANUAL THERAPY 1/> REGIONS: CPT | Performed by: OCCUPATIONAL THERAPIST

## 2019-10-02 PROCEDURE — 97112 NEUROMUSCULAR REEDUCATION: CPT | Performed by: PHYSICAL THERAPIST

## 2019-10-04 ENCOUNTER — HOSPITAL ENCOUNTER (OUTPATIENT)
Dept: PHYSICAL THERAPY | Age: 64
Setting detail: THERAPIES SERIES
Discharge: HOME | End: 2019-10-04
Payer: COMMERCIAL

## 2019-10-04 PROCEDURE — 97530 THERAPEUTIC ACTIVITIES: CPT | Performed by: PHYSICAL THERAPIST

## 2019-10-04 PROCEDURE — 97110 THERAPEUTIC EXERCISES: CPT | Performed by: PHYSICAL THERAPIST

## 2019-10-04 PROCEDURE — 97112 NEUROMUSCULAR REEDUCATION: CPT | Performed by: PHYSICAL THERAPIST

## 2019-10-07 ENCOUNTER — HOSPITAL ENCOUNTER (OUTPATIENT)
Dept: OCCUPATIONAL THERAPY | Age: 64
Setting detail: THERAPIES SERIES
Discharge: HOME | End: 2019-10-07
Payer: COMMERCIAL

## 2019-10-07 ENCOUNTER — HOSPITAL ENCOUNTER (OUTPATIENT)
Dept: PHYSICAL THERAPY | Age: 64
Setting detail: THERAPIES SERIES
Discharge: HOME | End: 2019-10-07
Payer: COMMERCIAL

## 2019-10-07 PROCEDURE — 97110 THERAPEUTIC EXERCISES: CPT

## 2019-10-07 PROCEDURE — 97112 NEUROMUSCULAR REEDUCATION: CPT

## 2019-10-07 PROCEDURE — 97110 THERAPEUTIC EXERCISES: CPT | Performed by: OCCUPATIONAL THERAPIST

## 2019-10-07 PROCEDURE — 97140 MANUAL THERAPY 1/> REGIONS: CPT | Performed by: OCCUPATIONAL THERAPIST

## 2019-10-09 ENCOUNTER — APPOINTMENT (OUTPATIENT)
Dept: OCCUPATIONAL THERAPY | Age: 64
End: 2019-10-09
Payer: COMMERCIAL

## 2019-10-14 ENCOUNTER — APPOINTMENT (OUTPATIENT)
Dept: OCCUPATIONAL THERAPY | Age: 64
End: 2019-10-14
Payer: COMMERCIAL

## 2019-10-15 ENCOUNTER — TELEPHONE (OUTPATIENT)
Dept: PRIMARY CARE CLINIC | Age: 64
End: 2019-10-15

## 2019-10-15 DIAGNOSIS — I63.9 ISCHEMIC STROKE (HCC): Primary | ICD-10-CM

## 2019-10-16 ENCOUNTER — APPOINTMENT (OUTPATIENT)
Dept: PHYSICAL THERAPY | Age: 64
End: 2019-10-16
Payer: COMMERCIAL

## 2019-10-16 ENCOUNTER — APPOINTMENT (OUTPATIENT)
Dept: OCCUPATIONAL THERAPY | Age: 64
End: 2019-10-16
Payer: COMMERCIAL

## 2019-10-18 ENCOUNTER — HOSPITAL ENCOUNTER (OUTPATIENT)
Dept: PHYSICAL THERAPY | Age: 64
Setting detail: THERAPIES SERIES
Discharge: HOME | End: 2019-10-18
Payer: COMMERCIAL

## 2019-11-18 RX ORDER — CLOPIDOGREL BISULFATE 75 MG/1
75 TABLET ORAL DAILY
Qty: 90 TABLET | Refills: 0 | Status: SHIPPED
Start: 2019-11-18 | End: 2020-02-20

## 2020-02-26 ENCOUNTER — TELEPHONE (OUTPATIENT)
Dept: NEUROLOGY | Age: 65
End: 2020-02-26

## 2020-02-26 NOTE — TELEPHONE ENCOUNTER
Pt informed f/u appt 2/27/20 would need rescheduled. Pt scheduled in unavailable appt slot.  Pt advised to return call

## 2020-06-01 RX ORDER — CLOPIDOGREL BISULFATE 75 MG/1
75 TABLET ORAL DAILY
Qty: 90 TABLET | Refills: 0 | OUTPATIENT
Start: 2020-06-01

## 2020-11-03 PROBLEM — I25.10 CAD (CORONARY ARTERY DISEASE): Status: RESOLVED | Noted: 2019-07-31 | Resolved: 2020-11-03

## 2021-03-11 NOTE — PROGRESS NOTES
Progress Note    Subjective/   61y.o. year old male on the rehab unit for stroke. Feels the melatonin made him groggy this morning and unable to participate as well with therapy. Wants it discontinued. No SOB or chest pain. Bowel and bladder OK. Objective/   VITALS:  /72   Pulse 67   Temp 97.5 °F (36.4 °C) (Temporal)   Resp 18   Ht 5' 10\" (1.778 m)   Wt 171 lb 14.4 oz (78 kg)   SpO2 94%   BMI 24.67 kg/m²   24HR INTAKE/OUTPUT:      Intake/Output Summary (Last 24 hours) at 8/10/2019 1416  Last data filed at 8/10/2019 1130  Gross per 24 hour   Intake 480 ml   Output 100 ml   Net 380 ml     Constitutional:  Alert, awake, no apparent distress   Cardiovascular:  S1, S2 without m/r/g   Respiratory:  CTA B without w/r/r   Abdomen: +BS  Ext: no pitting LE edema  Neuro: awake and alert, no tremor. Tone is normal.    Functional Level      Date   Status   AE    Comments     Feeding   8/6/19   Minimal Assist              Grooming   8/10/19   SBA, set up        To comb hair, apply deodorant      Bathing   8/10/19   MIN A        Pt completed full shower requiring assist to bathe buttocks, standing balance complete tasks. UB Dressing   8/10/19   Set-up       To og/doff pull over shirt with increased time      LB Dressing   8/10/19   Min A       To og underwear/shorts, socks and shoes requiring assist to pull R LE shoe over heel.       Homemaking   8/6/19    TBA                  Functional Transfers / Balance:      Date Status DME  Comments   Sit Balance 8/10/19 Stand by Assist    demo'd sitting tub shower and EOB    Stand Balance 8/10/19 Minimal Assist    demo'd during transfers, LE dressing    [] Tub  [x] Shower   Transfer 8/10/19 Mod A Tub bench  Pt completed v/c's for hand placement and sequencing    Commode   Transfer 8/8/19 Moderate Assist        Functional   Mobility 8/10/19 Mod A Cole cane  Pt requiring v/c's for sequencing    Other: supine>sit     Bed to w/c  8/9/19 8/9/19  SBA No

## 2022-07-05 RX ORDER — ESCITALOPRAM OXALATE 5 MG/1
TABLET ORAL
COMMUNITY

## 2022-07-05 RX ORDER — AMLODIPINE BESYLATE 5 MG/1
TABLET ORAL
COMMUNITY

## 2022-07-05 RX ORDER — LISINOPRIL 40 MG/1
TABLET ORAL
COMMUNITY

## 2022-07-05 RX ORDER — ASPIRIN 325 MG
TABLET ORAL
COMMUNITY

## 2022-07-05 RX ORDER — CLOPIDOGREL BISULFATE 75 MG/1
TABLET ORAL
COMMUNITY

## 2022-07-05 RX ORDER — ATORVASTATIN CALCIUM 40 MG/1
TABLET, FILM COATED ORAL
COMMUNITY

## 2022-07-05 RX ORDER — DIAZEPAM 10 MG/1
TABLET ORAL
COMMUNITY